# Patient Record
Sex: MALE | Race: WHITE | NOT HISPANIC OR LATINO | Employment: OTHER | URBAN - NONMETROPOLITAN AREA
[De-identification: names, ages, dates, MRNs, and addresses within clinical notes are randomized per-mention and may not be internally consistent; named-entity substitution may affect disease eponyms.]

---

## 2023-12-29 ENCOUNTER — OFFICE VISIT (OUTPATIENT)
Dept: OTOLARYNGOLOGY | Facility: CLINIC | Age: 33
End: 2023-12-29
Payer: COMMERCIAL

## 2023-12-29 VITALS
HEIGHT: 69 IN | HEART RATE: 78 BPM | TEMPERATURE: 97.5 F | BODY MASS INDEX: 27.43 KG/M2 | SYSTOLIC BLOOD PRESSURE: 136 MMHG | DIASTOLIC BLOOD PRESSURE: 80 MMHG | OXYGEN SATURATION: 98 % | WEIGHT: 185.2 LBS

## 2023-12-29 DIAGNOSIS — J31.0 CHRONIC RHINITIS: ICD-10-CM

## 2023-12-29 DIAGNOSIS — J34.89 NASAL OBSTRUCTION: Primary | ICD-10-CM

## 2023-12-29 DIAGNOSIS — Z98.890 S/P NASAL SEPTOPLASTY: ICD-10-CM

## 2023-12-29 DIAGNOSIS — J30.89 NON-SEASONAL ALLERGIC RHINITIS, UNSPECIFIED TRIGGER: ICD-10-CM

## 2023-12-29 PROCEDURE — 99204 OFFICE O/P NEW MOD 45 MIN: CPT | Performed by: OTOLARYNGOLOGY

## 2023-12-29 RX ORDER — FLUTICASONE PROPIONATE 50 MCG
2 SPRAY, SUSPENSION (ML) NASAL DAILY
COMMUNITY
Start: 2023-09-28 | End: 2024-09-27

## 2023-12-29 RX ORDER — IPRATROPIUM BROMIDE 42 UG/1
2 SPRAY, METERED NASAL 3 TIMES DAILY
Qty: 15 ML | Refills: 3 | Status: SHIPPED | OUTPATIENT
Start: 2023-12-29

## 2023-12-29 NOTE — PROGRESS NOTES
Review of Systems   Constitutional: Negative.    HENT:  Positive for congestion and sore throat.    Eyes: Negative.    Respiratory: Negative.     Cardiovascular: Negative.    Gastrointestinal: Negative.    Endocrine: Negative.    Genitourinary: Negative.    Musculoskeletal: Negative.    Skin: Negative.    Allergic/Immunologic: Negative.    Hematological: Negative.    Psychiatric/Behavioral: Negative.

## 2023-12-29 NOTE — PROGRESS NOTES
Otolaryngology Clinic Visit  Name:  Dae CEBALLOS  MRN:  [id number]  Date:  12/29/2023 10:41 AM  ________________________________________________________________________       CHIEF COMPLAINT:   Nasal obstruction     HPI:  Dae CEBALLOS is a 33 y.o. male s/p septoplasty in July 2023 at [facility] with persistent trouble breathing out of the right nostril, clear rhinitis, and post-nasal drip that has not significantly improved since surgery. He uses flonase but no saline rinses at this time. Does not report really any improvement after surgery. Likely worse. No other ENT questions or concerns today.    PMHx:  Past Medical History:   Diagnosis Date    Anxiety     Asthma     History of tonsillectomy and adenoidectomy        PSHx:  Past Surgical History:   Procedure Laterality Date    FINGER SURGERY      TONSILLECTOMY      TYMPANOSTOMY TUBE PLACEMENT         FAMHx:  History reviewed. No pertinent family history.    SOCHx:  Social History     Socioeconomic History    Marital status: /Civil Union     Spouse name: None    Number of children: None    Years of education: None    Highest education level: None   Occupational History    None   Tobacco Use    Smoking status: Never    Smokeless tobacco: Never    Tobacco comments:     Medical Marijuana-- Patient does not smoke tobacco.   Vaping Use    Vaping status: Never Used   Substance and Sexual Activity    Alcohol use: Yes     Comment: socially    Drug use: Yes     Types: Marijuana    Sexual activity: None   Other Topics Concern    None   Social History Narrative    None     Social Determinants of Health     Financial Resource Strain: Not on file   Food Insecurity: Not on file   Transportation Needs: Not on file   Physical Activity: Not on file   Stress: Not on file   Social Connections: Not on file   Intimate Partner Violence: Not on file   Housing Stability: Not on file       Allergies:  Allergies   Allergen Reactions    Penicillins Other (See Comments)     Family hx  "and patient has not had a reaction himself that he knows of        MEDS:  Reviewed    ROS:  As above       PHYSICAL EXAM:  /80 (BP Location: Left arm, Cuff Size: Large)   Pulse 78   Temp 97.5 °F (36.4 °C) (Temporal)   Ht 5' 9\" (1.753 m)   Wt 84 kg (185 lb 3.2 oz)   SpO2 98%   BMI 27.35 kg/m²   General: NAD, AOx4  Eyes:  EOMI  Ears:  Right: ear canal normal, TM normal appearance, no fluid  Left: ear canal normal, TM normal appearance, no fluid  Nose:  narrow nasal vestibule, there is caudal Right septal deviation, moderate inferior turbinate hypertrophy, no mass/lesions, palpable residual cartilage   Oral cavity:  No trismus, no mass/lesions, tonsils unremarkable, cobblestoning on posterior pharyngeal wall  Neck: Trachea is midline; no thyroid nodules, Salivary glands symmetrical, no masses/abnormality on palpation  Lymph:  No cervical lymphadenopathy  Skin:  No obvious facial lesions  Neuro:  Face symmetrical. No focal deficits   Lungs:  Normal work of breathing  Vascular: Well perfused    Procedures:   None    Medical Data Reviewed:  Records reviewed and summarized as in [facility]    Radiology:  None    Labs:   None    There is no problem list on file for this patient.      ASSESSMENT/PLAN:  Dae CEBALLOS is a 33 y.o. male with acute and chronic problems as above who presents with:    1. Nasal obstruction    2. Non-seasonal allergic rhinitis, unspecified trigger    3. S/P nasal septoplasty    4. Chronic rhinitis        33 y.o. here today for further evaluation of nasal obstruction status post septoplasty over the summer at [facility].  On physical exam he has significant anterior caudal septal deviation to the right.  Discussed how surgical options would be an open rhinoplasty with caudal reconstruction.  We will refer him to Dr. Chauhan for further evaluation and consideration of surgical options.  For his postnasal drip, we will add Atrovent nasal spray onto his Flonase. Might consider PNA at that same " time    Return to clinic as needed.  Patient will follow-up with Dr. Chauhan at [place]     Marco Mak MD MPH  Otolaryngology--Head and Neck Surgery  [facility]  12/29/2023 11:14 AM

## 2024-01-03 ENCOUNTER — OFFICE VISIT (OUTPATIENT)
Dept: URGENT CARE | Facility: MEDICAL CENTER | Age: 34
End: 2024-01-03
Payer: COMMERCIAL

## 2024-01-03 VITALS
HEART RATE: 90 BPM | TEMPERATURE: 98.2 F | BODY MASS INDEX: 27.91 KG/M2 | RESPIRATION RATE: 18 BRPM | OXYGEN SATURATION: 96 % | SYSTOLIC BLOOD PRESSURE: 108 MMHG | WEIGHT: 189 LBS | DIASTOLIC BLOOD PRESSURE: 64 MMHG

## 2024-01-03 DIAGNOSIS — H92.02 OTALGIA OF LEFT EAR: ICD-10-CM

## 2024-01-03 DIAGNOSIS — H65.113 ACUTE MUCOID OTITIS MEDIA OF BOTH EARS: Primary | ICD-10-CM

## 2024-01-03 PROCEDURE — 99214 OFFICE O/P EST MOD 30 MIN: CPT | Performed by: NURSE PRACTITIONER

## 2024-01-03 RX ORDER — CLINDAMYCIN HYDROCHLORIDE 300 MG/1
300 CAPSULE ORAL 3 TIMES DAILY
Qty: 21 CAPSULE | Refills: 0 | Status: SHIPPED | OUTPATIENT
Start: 2024-01-03 | End: 2024-01-10

## 2024-01-03 RX ORDER — OFLOXACIN 3 MG/ML
5 SOLUTION AURICULAR (OTIC) 2 TIMES DAILY
Qty: 10 ML | Refills: 0 | Status: SHIPPED | OUTPATIENT
Start: 2024-01-03

## 2024-01-03 NOTE — PROGRESS NOTES
[facility]        NAME: Dae CEBALLOS is a 33 y.o. male  : 1990    MRN: [id number]  DATE: January 3, 2024  TIME: 5:13 PM    Assessment and Plan   Acute mucoid otitis media of both ears [H65.113]  1. Acute mucoid otitis media of both ears  clindamycin (CLEOCIN) 300 MG capsule    ofloxacin (FLOXIN) 0.3 % otic solution      2. Otalgia of left ear  clindamycin (CLEOCIN) 300 MG capsule    ofloxacin (FLOXIN) 0.3 % otic solution            Patient Instructions       Follow up with PCP in 3-5 days.  Proceed to  ER if symptoms worsen.  You have been prescribed clindamycin and ear drops for bilateral ear infection  Take tylenol and motrin for pain/fever  Follow up with your PCP in 3-5 days  Go to the ED if symptoms worsen        Chief Complaint     Chief Complaint   Patient presents with    Earache     Left ear pressure and pain. Behind and below ear. Started about 2 days ago.         History of Present Illness       This is a 33 year old male who states is having left ear pressure with pain below the left ear for about 2 days.  Denies pain over the bone behind the ear. Denies fevers, chills, n/v/d. Taking tylenol/motrin for pain.  PMH as listed         Review of Systems   Review of Systems   Constitutional: Negative.    HENT:  Positive for ear pain. Negative for ear discharge.    Eyes: Negative.    Respiratory: Negative.     Cardiovascular: Negative.    Gastrointestinal: Negative.    Endocrine: Negative.    Genitourinary: Negative.    Musculoskeletal: Negative.    Skin: Negative.    Allergic/Immunologic: Negative.    Neurological: Negative.    Hematological: Negative.    Psychiatric/Behavioral: Negative.           Current Medications       Current Outpatient Medications:     albuterol (PROVENTIL HFA,VENTOLIN HFA) 90 mcg/act inhaler, Inhale 2 puffs every 6 (six) hours as needed for wheezing, Disp: , Rfl:     clindamycin (CLEOCIN) 300 MG capsule, Take 1 capsule (300 mg total) by mouth 3 (three) times a day  for 7 days, Disp: 21 capsule, Rfl: 0    Dulera 200-5 MCG/ACT inhaler, INHALE 2 PUFFS EVERY 12 (TWELVE) HOURS. RINSE, GARGLE AND SPIT POST USE., Disp: , Rfl:     ipratropium (ATROVENT) 0.06 % nasal spray, 2 sprays into each nostril 3 (three) times a day, Disp: 15 mL, Rfl: 3    meclizine (ANTIVERT) 25 mg tablet, Take 0.5 tablets (12.5 mg total) by mouth 3 (three) times a day as needed for dizziness, Disp: 30 tablet, Rfl: 0    montelukast (SINGULAIR) 10 mg tablet, TAKE 1 TABLET BY MOUTH EVERY DAY AT NIGHT, Disp: , Rfl:     NON FORMULARY, Medical Marijuana, Disp: , Rfl:     ofloxacin (FLOXIN) 0.3 % otic solution, Administer 5 drops into both ears 2 (two) times a day, Disp: 10 mL, Rfl: 0    omeprazole (PriLOSEC) 20 mg delayed release capsule, Take 20 mg by mouth daily, Disp: , Rfl:     fluticasone (FLONASE) 50 mcg/act nasal spray, 2 sprays into each nostril daily, Disp: , Rfl:     methocarbamol (ROBAXIN) 500 mg tablet, Take 1 tablet (500 mg total) by mouth 2 (two) times a day (Patient not taking: Reported on 1/7/2021), Disp: 20 tablet, Rfl: 0    Current Allergies     Allergies as of 01/03/2024 - Reviewed 01/03/2024   Allergen Reaction Noted    Penicillins Other (See Comments) 09/02/2017            The following portions of the patient's history were reviewed and updated as appropriate: allergies, current medications, past family history, past medical history, past social history, past surgical history and problem list.     Past Medical History:   Diagnosis Date    Anxiety     Asthma     History of tonsillectomy and adenoidectomy        Past Surgical History:   Procedure Laterality Date    FINGER SURGERY      SEPTOPLASTY      TONSILLECTOMY      TYMPANOSTOMY TUBE PLACEMENT         History reviewed. No pertinent family history.      Medications have been verified.        Objective   /64   Pulse 90   Temp 98.2 °F (36.8 °C)   Resp 18   Wt 85.7 kg (189 lb)   SpO2 96%   BMI 27.91 kg/m²   No LMP for male patient.        Physical Exam     Physical Exam  Vitals and nursing note reviewed.   Constitutional:       General: He is not in acute distress.     Appearance: Normal appearance. He is normal weight. He is not ill-appearing, toxic-appearing or diaphoretic.   HENT:      Head: Normocephalic and atraumatic.      Ears:      Comments: B/L TM with yellow mucoid noted.  No drainage.  Ear canal slightly red inflamed  No TTP over mastoid bone.  Ear does not protrude out.       Nose: Congestion present. No rhinorrhea.      Mouth/Throat:      Mouth: Mucous membranes are moist.      Pharynx: Oropharynx is clear. No oropharyngeal exudate or posterior oropharyngeal erythema.   Eyes:      Extraocular Movements: Extraocular movements intact.   Cardiovascular:      Rate and Rhythm: Normal rate and regular rhythm.      Pulses: Normal pulses.      Heart sounds: Normal heart sounds.   Pulmonary:      Effort: Pulmonary effort is normal.      Breath sounds: Normal breath sounds.   Musculoskeletal:         General: Normal range of motion.      Cervical back: Normal range of motion and neck supple.   Skin:     General: Skin is warm and dry.      Capillary Refill: Capillary refill takes less than 2 seconds.   Neurological:      General: No focal deficit present.      Mental Status: He is alert and oriented to person, place, and time.   Psychiatric:         Mood and Affect: Mood normal.         Behavior: Behavior normal.         Thought Content: Thought content normal.         Judgment: Judgment normal.

## 2024-01-03 NOTE — PATIENT INSTRUCTIONS
You have been prescribed clindamycin and ear drops for bilateral ear infection  Take tylenol and motrin for pain/fever  Follow up with your PCP in 3-5 days  Go to the ED if symptoms worsen

## 2024-01-04 ENCOUNTER — TELEPHONE (OUTPATIENT)
Dept: INTERNAL MEDICINE CLINIC | Facility: CLINIC | Age: 34
End: 2024-01-04

## 2024-01-04 NOTE — TELEPHONE ENCOUNTER
----- Message from Marco Mak MD sent at 1/2/2024  6:04 PM EST -----  Regarding: RE:   That's fine    Thank you !  ----- Message -----  From: Cathleen Ag LPN  Sent: 1/2/2024   5:38 PM EST  To: Laine Chaves; Marco Mak MD    Yes I can schedule this. Dr. Elkins next clinic day is February 23rd. Is it ok for patient to wait until then to see him?    ----- Message -----  From: Laine Chaves  Sent: 1/2/2024   7:45 AM EST  To: Cathleen Ag LPN    Hi Cathleen,    Are you able to facilitate this for Dr. Mak Please?  ----- Message -----  From: Marco Mak MD  Sent: 12/29/2023  10:42 AM EST  To: Laine Chaves    Can we have Vaugh see Dr Chauhan at [facility] for FNR eval.    I dont know how to coordinate that

## 2024-02-23 ENCOUNTER — CONSULT (OUTPATIENT)
Dept: MULTI SPECIALTY CLINIC | Facility: CLINIC | Age: 34
End: 2024-02-23

## 2024-02-23 VITALS
HEART RATE: 73 BPM | BODY MASS INDEX: 28.94 KG/M2 | DIASTOLIC BLOOD PRESSURE: 77 MMHG | TEMPERATURE: 98.6 F | OXYGEN SATURATION: 98 % | SYSTOLIC BLOOD PRESSURE: 115 MMHG | WEIGHT: 196 LBS

## 2024-02-23 DIAGNOSIS — J34.2 DNS (DEVIATED NASAL SEPTUM): ICD-10-CM

## 2024-02-23 DIAGNOSIS — J34.89 NASAL OBSTRUCTION: Primary | ICD-10-CM

## 2024-02-23 PROCEDURE — 99213 OFFICE O/P EST LOW 20 MIN: CPT | Performed by: OTOLARYNGOLOGY

## 2024-02-23 RX ORDER — IPRATROPIUM BROMIDE 42 UG/1
2 SPRAY, METERED NASAL 3 TIMES DAILY
Qty: 15 ML | Refills: 3 | Status: SHIPPED | OUTPATIENT
Start: 2024-02-23

## 2024-02-23 NOTE — PROGRESS NOTES
Dae CEBALLOS is a 33 y.o.male who presents for re-evaluation of nasal obstruction. Previous septoplasty July 2023. Has remaining right caudal deviation. Continued obstructive symptoms.  Currently on flonase and atrovent without relief.   Past Medical History:   Diagnosis Date    Anxiety     Asthma     History of tonsillectomy and adenoidectomy        /77 (BP Location: Left arm, Patient Position: Sitting, Cuff Size: Large)   Pulse 73   Temp 98.6 °F (37 °C) (Temporal)   Wt 88.9 kg (196 lb)   SpO2 98%   BMI 28.94 kg/m²       Physical Exam   Constitutional: Oriented to person, place, and time. Well-developed and well-nourished, no apparent distress, non-toxic appearance. Cooperative, able to hear and answer questions without difficulty.    Voice: Normal voice quality.  Head: Normocephalic, atraumatic.  No scars, masses or lesions.  Face: Symmetric, no edema, no sinus tenderness.  Eyes: Vision grossly intact, extra-ocular movement intact.  Ears: External ear normal.  Bilateral tympanic membranes are intact with intact normal landmarks. No post-auricular erythema or tenderness.  Nose:    Mucosa: Edematous   Turbinates: reduced bilaterally    Septum: right caudal septal deviation    Internal valves: bilateral internal valve stenosis   External valves:   Right  Left      Zone 1: 0  1      Zone 2: 0  1   Tip support: poor tip support    External contour: rightward deviation of the nasal pyramid    Nasal bones: no palpable nasal bone fractures     Oral cavity: Dentition intact.  Mucosa moist, lips normal.  Tongue mobile, floor of mouth normal.  Hard palate unremarkable.  No masses or lesions.   Oropharynx: Uvula is midline, soft palate normal.  Unremarkable oropharyngeal inlet.  Tonsils unremarkable.  Posterior pharyngeal wall clear. No masses or lesions.  Salivary glands:  Parotid glands and submandibular glands symmetric, no enlargement or tenderness.  Neck: Normal laryngeal elevation with swallow.  Trachea  midline.  No masses or lesions. No palpable adenopathy.  Thyroid: normal in size, unremarkable without tenderness or palpable nodules.  Pulmonary/Chest: Normal effort and rate. No respiratory distress. Clear to ausculation bilaterally  Musculoskeletal: Normal range of motion.   Neurological: Cranial nerves 2-12 intact.  Skin: Skin is warm and dry.   Psychiatric: Normal mood and affect.  CV: RRR          A/P: Nasal obstruction: Risks and benefits were again reviewed with the patient, including but not limited to bleeding, infection, external scars, internal scars, breathing obstruction, septal perforation, external deformity, numbness or stiffness of the nasal tip, asymmetry, need for revision, numbness of the upper teeth, among others.  Questions were answered.  History and physical and consent were obtained today in clinic.  Plan is for:    Discussed surgical timing planned for 1 year after original surgery (July 2023). Will plan for ASR, RNVS, neuromark     We discussed the need for rib grafting.  We discussed options for autologous and cadaveric rib grafting.  We discussed the procedure including risks of significant discomfort, pneumothorax, warping of the graft, infection of the graft, and need for other procedures in case of complications.      Patient did not sign consent in clinic. Will sign consent form day of surgery.

## 2024-02-27 ENCOUNTER — TELEPHONE (OUTPATIENT)
Dept: INTERNAL MEDICINE CLINIC | Facility: CLINIC | Age: 34
End: 2024-02-27

## 2024-02-27 NOTE — TELEPHONE ENCOUNTER
Dr. Chauhan in office and requesting surgery for patient. Requesting surgery revision septoplasty, repair nasal vestibular stenosis +/- ribgrafting. Please schedule patient surgery. Consent in media, Tennille CHASE forget to have signed.

## 2024-03-04 ENCOUNTER — HOSPITAL ENCOUNTER (EMERGENCY)
Facility: HOSPITAL | Age: 34
Discharge: HOME | End: 2024-03-04
Attending: EMERGENCY MEDICINE
Payer: COMMERCIAL

## 2024-03-04 ENCOUNTER — APPOINTMENT (EMERGENCY)
Dept: RADIOLOGY | Facility: HOSPITAL | Age: 34
End: 2024-03-04
Payer: COMMERCIAL

## 2024-03-04 VITALS
TEMPERATURE: 97.9 F | RESPIRATION RATE: 20 BRPM | OXYGEN SATURATION: 98 % | SYSTOLIC BLOOD PRESSURE: 123 MMHG | DIASTOLIC BLOOD PRESSURE: 75 MMHG | HEART RATE: 67 BPM

## 2024-03-04 DIAGNOSIS — R06.02 SOB (SHORTNESS OF BREATH): Primary | ICD-10-CM

## 2024-03-04 LAB
ANION GAP SERPL CALCULATED.3IONS-SCNC: 11 MMOL/L
BASOPHILS # BLD AUTO: 0.04 THOUSANDS/ÂΜL (ref 0–0.1)
BASOPHILS NFR BLD AUTO: 1 % (ref 0–1)
BUN SERPL-MCNC: 15 MG/DL (ref 5–25)
CALCIUM SERPL-MCNC: 9.4 MG/DL (ref 8.4–10.2)
CHLORIDE SERPL-SCNC: 104 MMOL/L (ref 96–108)
CO2 SERPL-SCNC: 24 MMOL/L (ref 21–32)
CREAT SERPL-MCNC: 0.91 MG/DL (ref 0.6–1.3)
EOSINOPHIL # BLD AUTO: 0.03 THOUSAND/ÂΜL (ref 0–0.61)
EOSINOPHIL NFR BLD AUTO: 1 % (ref 0–6)
ERYTHROCYTE [DISTWIDTH] IN BLOOD BY AUTOMATED COUNT: 12.7 % (ref 11.6–15.1)
GFR SERPL CREATININE-BSD FRML MDRD: 111 ML/MIN/1.73SQ M
GLUCOSE SERPL-MCNC: 107 MG/DL (ref 65–140)
HCT VFR BLD AUTO: 39.2 % (ref 36.5–49.3)
HGB BLD-MCNC: 13.9 G/DL (ref 12–17)
IMM GRANULOCYTES # BLD AUTO: 0.01 THOUSAND/UL (ref 0–0.2)
IMM GRANULOCYTES NFR BLD AUTO: 0 % (ref 0–2)
LYMPHOCYTES # BLD AUTO: 3.18 THOUSANDS/ÂΜL (ref 0.6–4.47)
LYMPHOCYTES NFR BLD AUTO: 51 % (ref 14–44)
MCH RBC QN AUTO: 29.6 PG (ref 26.8–34.3)
MCHC RBC AUTO-ENTMCNC: 35.5 G/DL (ref 31.4–37.4)
MCV RBC AUTO: 83 FL (ref 82–98)
MONOCYTES # BLD AUTO: 0.55 THOUSAND/ÂΜL (ref 0.17–1.22)
MONOCYTES NFR BLD AUTO: 9 % (ref 4–12)
NEUTROPHILS # BLD AUTO: 2.3 THOUSANDS/ÂΜL (ref 1.85–7.62)
NEUTS SEG NFR BLD AUTO: 38 % (ref 43–75)
NRBC BLD AUTO-RTO: 0 /100 WBCS
PLATELET # BLD AUTO: 356 THOUSANDS/UL (ref 149–390)
PMV BLD AUTO: 9.4 FL (ref 8.9–12.7)
POTASSIUM SERPL-SCNC: 3.4 MMOL/L (ref 3.5–5.3)
RBC # BLD AUTO: 4.7 MILLION/UL (ref 3.88–5.62)
SODIUM SERPL-SCNC: 139 MMOL/L (ref 135–147)
WBC # BLD AUTO: 6.11 THOUSAND/UL (ref 4.31–10.16)

## 2024-03-04 PROCEDURE — 80048 BASIC METABOLIC PNL TOTAL CA: CPT | Performed by: EMERGENCY MEDICINE

## 2024-03-04 PROCEDURE — 36415 COLL VENOUS BLD VENIPUNCTURE: CPT | Performed by: EMERGENCY MEDICINE

## 2024-03-04 PROCEDURE — 85025 COMPLETE CBC W/AUTO DIFF WBC: CPT | Performed by: EMERGENCY MEDICINE

## 2024-03-04 PROCEDURE — 99285 EMERGENCY DEPT VISIT HI MDM: CPT | Performed by: EMERGENCY MEDICINE

## 2024-03-04 PROCEDURE — 93005 ELECTROCARDIOGRAM TRACING: CPT

## 2024-03-04 PROCEDURE — 71046 X-RAY EXAM CHEST 2 VIEWS: CPT

## 2024-03-04 PROCEDURE — 96374 THER/PROPH/DIAG INJ IV PUSH: CPT

## 2024-03-04 PROCEDURE — 99285 EMERGENCY DEPT VISIT HI MDM: CPT

## 2024-03-04 RX ORDER — KETOROLAC TROMETHAMINE 30 MG/ML
15 INJECTION, SOLUTION INTRAMUSCULAR; INTRAVENOUS ONCE
Status: COMPLETED | OUTPATIENT
Start: 2024-03-04 | End: 2024-03-04

## 2024-03-04 RX ADMIN — KETOROLAC TROMETHAMINE 15 MG: 30 INJECTION, SOLUTION INTRAMUSCULAR; INTRAVENOUS at 20:05

## 2024-03-05 NOTE — DISCHARGE INSTRUCTIONS
Please continue nebulizer/albuterol treatment every 3-4 hours as needed for shortness of breath.

## 2024-03-05 NOTE — ED PROVIDER NOTES
History  Chief Complaint   Patient presents with    Shortness of Breath     Per pt, began w/ worsening SOB this morning. C/o coughing attacks.     HPI    33-year-old male with past medical history of asthma who presents for evaluation of shortness of breath patient states he has been having worsening shortness of breath since this morning.  Patient also states he has had some right-sided chest pain which feels like a cramping sensation.  Denies any pain in his back.  Denies any worsening of pain with taking a deep breath.  Patient states he does have a history of asthma but states this feels different.  Denies lightheadedness.  Denies fevers.  Denies nausea, vomiting or diarrhea. He has had a cough today. He also states he has myalgias. Denies leg swelling.     Prior to Admission Medications   Prescriptions Last Dose Informant Patient Reported? Taking?   Dulera 200-5 MCG/ACT inhaler  Self Yes No   Sig: INHALE 2 PUFFS EVERY 12 (TWELVE) HOURS. RINSE, GARGLE AND SPIT POST USE.   NON FORMULARY  Self Yes No   Sig: Medical Marijuana   albuterol (PROVENTIL HFA,VENTOLIN HFA) 90 mcg/act inhaler  Self Yes No   Sig: Inhale 2 puffs every 6 (six) hours as needed for wheezing   fluticasone (FLONASE) 50 mcg/act nasal spray  Self Yes No   Si sprays into each nostril daily   ipratropium (ATROVENT) 0.06 % nasal spray   No No   Si sprays into each nostril 3 (three) times a day   meclizine (ANTIVERT) 25 mg tablet  Self No No   Sig: Take 0.5 tablets (12.5 mg total) by mouth 3 (three) times a day as needed for dizziness   methocarbamol (ROBAXIN) 500 mg tablet  Self No No   Sig: Take 1 tablet (500 mg total) by mouth 2 (two) times a day   Patient not taking: Reported on 2021   montelukast (SINGULAIR) 10 mg tablet  Self Yes No   Sig: TAKE 1 TABLET BY MOUTH EVERY DAY AT NIGHT   ofloxacin (FLOXIN) 0.3 % otic solution   No No   Sig: Administer 5 drops into both ears 2 (two) times a day   omeprazole (PriLOSEC) 20 mg delayed release  capsule  Self Yes No   Sig: Take 20 mg by mouth daily      Facility-Administered Medications: None       Past Medical History:   Diagnosis Date    Anxiety     Asthma     History of tonsillectomy and adenoidectomy        Past Surgical History:   Procedure Laterality Date    FINGER SURGERY      SEPTOPLASTY      TONSILLECTOMY      TYMPANOSTOMY TUBE PLACEMENT         History reviewed. No pertinent family history.  I have reviewed and agree with the history as documented.    E-Cigarette/Vaping    E-Cigarette Use Never User      E-Cigarette/Vaping Substances     Social History     Tobacco Use    Smoking status: Never    Smokeless tobacco: Never    Tobacco comments:     Medical Marijuana-- Patient does not smoke tobacco.   Vaping Use    Vaping status: Never Used   Substance Use Topics    Alcohol use: Yes     Comment: socially    Drug use: Yes     Types: Marijuana       Review of Systems   Constitutional:  Negative for appetite change, chills and fever.   HENT:  Negative for congestion, rhinorrhea and sore throat.    Respiratory:  Positive for cough and shortness of breath.    Cardiovascular:  Positive for chest pain. Negative for leg swelling.   Gastrointestinal:  Negative for abdominal pain, diarrhea, nausea and vomiting.   Genitourinary:  Negative for dysuria, frequency, hematuria and urgency.   Musculoskeletal:  Negative for arthralgias and myalgias.   Skin:  Negative for rash.   Neurological:  Negative for dizziness, weakness, light-headedness, numbness and headaches.   All other systems reviewed and are negative.      Physical Exam  Physical Exam  Vitals and nursing note reviewed.   Constitutional:       General: He is not in acute distress.     Appearance: Normal appearance. He is well-developed and normal weight. He is not ill-appearing, toxic-appearing or diaphoretic.   HENT:      Head: Normocephalic and atraumatic.      Right Ear: External ear normal.      Left Ear: External ear normal.      Nose: Nose normal.       Mouth/Throat:      Mouth: Mucous membranes are moist.      Pharynx: Oropharynx is clear.   Eyes:      Extraocular Movements: Extraocular movements intact.      Conjunctiva/sclera: Conjunctivae normal.   Cardiovascular:      Rate and Rhythm: Normal rate and regular rhythm.      Pulses: Normal pulses.      Heart sounds: Normal heart sounds. No murmur heard.     No friction rub. No gallop.   Pulmonary:      Effort: Pulmonary effort is normal. No respiratory distress.      Breath sounds: Normal breath sounds. No decreased breath sounds, wheezing, rhonchi or rales.   Abdominal:      General: There is no distension.      Palpations: Abdomen is soft.      Tenderness: There is no abdominal tenderness. There is no guarding or rebound.   Musculoskeletal:         General: No tenderness.      Cervical back: Neck supple.      Right lower leg: No tenderness. No edema.      Left lower leg: No tenderness. No edema.   Skin:     General: Skin is warm and dry.      Coloration: Skin is not pale.      Findings: No rash.   Neurological:      General: No focal deficit present.      Mental Status: He is alert and oriented to person, place, and time.      Cranial Nerves: No cranial nerve deficit.      Sensory: No sensory deficit.      Motor: No weakness.   Psychiatric:         Mood and Affect: Mood normal.         Behavior: Behavior normal.         Vital Signs  ED Triage Vitals   Temperature Pulse Respirations Blood Pressure SpO2   03/04/24 1948 03/04/24 1945 03/04/24 1945 03/04/24 1945 03/04/24 1944   97.9 °F (36.6 °C) 73 21 126/81 99 %      Temp Source Heart Rate Source Patient Position - Orthostatic VS BP Location FiO2 (%)   03/04/24 1948 03/04/24 1948 -- -- --   Tympanic Monitor         Pain Score       03/04/24 1944       10 - Worst Possible Pain           Vitals:    03/04/24 1945 03/04/24 1948 03/04/24 2051   BP: 126/81 126/81 123/75   Pulse: 73 80 67         Visual Acuity      ED Medications  Medications   ketorolac (TORADOL)  injection 15 mg (15 mg Intravenous Given 3/4/24 2005)       Diagnostic Studies  Results Reviewed       Procedure Component Value Units Date/Time    Basic metabolic panel [[id number]]  (Abnormal) Collected: 03/04/24 2006    Lab Status: Final result Specimen: Blood from Arm, Left Updated: 03/04/24 2028     Sodium 139 mmol/L      Potassium 3.4 mmol/L      Chloride 104 mmol/L      CO2 24 mmol/L      ANION GAP 11 mmol/L      BUN 15 mg/dL      Creatinine 0.91 mg/dL      Glucose 107 mg/dL      Calcium 9.4 mg/dL      eGFR 111 ml/min/1.73sq m     Narrative:      National Kidney Disease Foundation guidelines for Chronic Kidney Disease (CKD):     Stage 1 with normal or high GFR (GFR > 90 mL/min/1.73 square meters)    Stage 2 Mild CKD (GFR = 60-89 mL/min/1.73 square meters)    Stage 3A Moderate CKD (GFR = 45-59 mL/min/1.73 square meters)    Stage 3B Moderate CKD (GFR = 30-44 mL/min/1.73 square meters)    Stage 4 Severe CKD (GFR = 15-29 mL/min/1.73 square meters)    Stage 5 End Stage CKD (GFR <15 mL/min/1.73 square meters)  Note: GFR calculation is accurate only with a steady state creatinine    CBC and differential [[id number]]  (Abnormal) Collected: 03/04/24 2006    Lab Status: Final result Specimen: Blood from Arm, Left Updated: 03/04/24 2013     WBC 6.11 Thousand/uL      RBC 4.70 Million/uL      Hemoglobin 13.9 g/dL      Hematocrit 39.2 %      MCV 83 fL      MCH 29.6 pg      MCHC 35.5 g/dL      RDW 12.7 %      MPV 9.4 fL      Platelets 356 Thousands/uL      nRBC 0 /100 WBCs      Neutrophils Relative 38 %      Immat GRANS % 0 %      Lymphocytes Relative 51 %      Monocytes Relative 9 %      Eosinophils Relative 1 %      Basophils Relative 1 %      Neutrophils Absolute 2.30 Thousands/µL      Immature Grans Absolute 0.01 Thousand/uL      Lymphocytes Absolute 3.18 Thousands/µL      Monocytes Absolute 0.55 Thousand/µL      Eosinophils Absolute 0.03 Thousand/µL      Basophils Absolute 0.04 Thousands/µL                    XR chest 2  views   Final Result by Chadwick Frazier MD (03/05 0843)      No acute cardiopulmonary disease.            Workstation performed: [id number]                    Procedures  Procedures         ED Course                                             Medical Decision Making  Amount and/or Complexity of Data Reviewed  Labs: ordered.  Radiology: ordered.    Risk  Prescription drug management.      33-year-old male with past medical history of asthma who presents for evaluation of shortness of breath patient states he has been having worsening shortness of breath since this morning.   Vital signs normal, pulse ox 97 to 98%, no increased work of breathing or tachypnea.  Suspect viral syndrome  Patient does not have any wheezing.  Will treat with Toradol for chest pain.  Will obtain chest x-ray to evaluate for pneumonia.  Will obtain CBC to evaluate for anemia, BMP to evaluate for metabolic abnormality.  Will obtain EKG to evaluate for arrhythmia or pericarditis.   Reviewed labs, no marked abnormalities.  Reviewed and interpreted chest x-ray, no acute cardiopulmonary disease.  Reviewed and interpreted EKG, shows normal sinus rhythm without acute ischemic changes, no signs of pericarditis.  Reassessed patient, chest pain slightly improved.  Breathing still unlabored, oxygenation normal.  Will discharge patient home.  Will have patient continue albuterol inhalers as needed for wheezing.  Discussed with patient strict return precautions.  Patient expressed understanding and was agreeable for discharge.         Disposition  Final diagnoses:   SOB (shortness of breath)     Time reflects when diagnosis was documented in both MDM as applicable and the Disposition within this note       Time User Action Codes Description Comment    3/4/2024  9:00 PM Radha Saavedra Add [R06.02] SOB (shortness of breath)           ED Disposition       ED Disposition   Discharge    Condition   Stable    Date/Time   Mon Mar 4, 2024  9:00 PM    Comment    Dae CEBALLOS discharge to home/self care.                   Follow-up Information       Follow up With Specialties Details Why Contact Info    Duglas Hartman,  Family Medicine   [address] [facility] [address] [phone]              Discharge Medication List as of 3/4/2024  9:00 PM        CONTINUE these medications which have NOT CHANGED    Details   albuterol (PROVENTIL HFA,VENTOLIN HFA) 90 mcg/act inhaler Inhale 2 puffs every 6 (six) hours as needed for wheezing, Historical Med      Dulera 200-5 MCG/ACT inhaler INHALE 2 PUFFS EVERY 12 (TWELVE) HOURS. RINSE, GARGLE AND SPIT POST USE., Historical Med      fluticasone (FLONASE) 50 mcg/act nasal spray 2 sprays into each nostril daily, Starting Thu 9/28/2023, Until Fri 9/27/2024, Historical Med      ipratropium (ATROVENT) 0.06 % nasal spray 2 sprays into each nostril 3 (three) times a day, Starting Fri 2/23/2024, Normal      meclizine (ANTIVERT) 25 mg tablet Take 0.5 tablets (12.5 mg total) by mouth 3 (three) times a day as needed for dizziness, Starting Fri 8/19/2022, Normal      methocarbamol (ROBAXIN) 500 mg tablet Take 1 tablet (500 mg total) by mouth 2 (two) times a day, Starting Tue 12/29/2020, Normal      montelukast (SINGULAIR) 10 mg tablet TAKE 1 TABLET BY MOUTH EVERY DAY AT NIGHT, Historical Med      NON FORMULARY Medical Marijuana, Historical Med      ofloxacin (FLOXIN) 0.3 % otic solution Administer 5 drops into both ears 2 (two) times a day, Starting Wed 1/3/2024, Normal      omeprazole (PriLOSEC) 20 mg delayed release capsule Take 20 mg by mouth daily, Starting Wed 8/2/2023, Historical Med             No discharge procedures on file.    PDMP Review       None            ED Provider  Electronically Signed by             Radha Saavedra MD  03/05/24 2782

## 2024-03-07 LAB
ATRIAL RATE: 66 BPM
P AXIS: 81 DEGREES
PR INTERVAL: 138 MS
QRS AXIS: 70 DEGREES
QRSD INTERVAL: 90 MS
QT INTERVAL: 406 MS
QTC INTERVAL: 425 MS
T WAVE AXIS: 67 DEGREES
VENTRICULAR RATE: 66 BPM

## 2024-03-07 PROCEDURE — 93010 ELECTROCARDIOGRAM REPORT: CPT | Performed by: INTERNAL MEDICINE

## 2024-05-07 ENCOUNTER — TELEPHONE (OUTPATIENT)
Age: 34
End: 2024-05-07

## 2024-05-07 NOTE — TELEPHONE ENCOUNTER
Patient's spouse called for information about scheduling surgery. Patient was seen by Dr. Mak in December. Please advise.

## 2024-05-07 NOTE — TELEPHONE ENCOUNTER
Explained that Dr Mak does not come to [place] anymore. The Dae needs to be seen at the [facility] in [place]. Provided the phone to them.

## 2024-06-07 ENCOUNTER — OFFICE VISIT (OUTPATIENT)
Age: 34
End: 2024-06-07
Payer: COMMERCIAL

## 2024-06-07 VITALS
HEART RATE: 80 BPM | WEIGHT: 196 LBS | HEIGHT: 69 IN | RESPIRATION RATE: 18 BRPM | OXYGEN SATURATION: 98 % | BODY MASS INDEX: 29.03 KG/M2

## 2024-06-07 DIAGNOSIS — R10.13 DYSPEPSIA: ICD-10-CM

## 2024-06-07 DIAGNOSIS — R10.11 RIGHT UPPER QUADRANT ABDOMINAL PAIN: Primary | ICD-10-CM

## 2024-06-07 DIAGNOSIS — R19.5 LOOSE STOOLS: ICD-10-CM

## 2024-06-07 PROCEDURE — 99204 OFFICE O/P NEW MOD 45 MIN: CPT | Performed by: STUDENT IN AN ORGANIZED HEALTH CARE EDUCATION/TRAINING PROGRAM

## 2024-06-07 NOTE — PATIENT INSTRUCTIONS
We will order a HIDA scan to rule out functional gallbladder issues  We will also order a stool test to rule out infection of the stomach called H.pylori and also inflammatory bowel and pancreas function  You will need to hold the omeprazole for at least 2 weeks before doing the stool test  Take the omeprazole every other day for 2 weeks and then stop for 2 weeks before doing the stool test

## 2024-06-11 NOTE — PROGRESS NOTES
[facility]  Outpatient New Patient  Encounter: [id number]     PATIENT INFO     Name: Dae CEBALLOS  YOB: 1990   Age: 34 y.o.   Sex: male   MRN: [id number]    ASSESSMENT & PLAN     Dae CEBALLOS is a 34 y.o. male with history of seasonal allergies who presents to GI office for second opinion regarding right upper quadrant pain and dyspepsia symptoms.    Problem List Items Addressed This Visit       Right upper quadrant abdominal pain - Primary     The pain that he is experiencing appears to be coming from the right subcostal/right upper quadrant area.  He has had ultrasound which was negative for gallstones.  However, he has not had any improvement in his symptoms with omeprazole and Pepcid.  He has undergone endoscopic evaluation which was largely unremarkable and esophageal manometry which was also normal.  His symptoms may actually be due to biliary pathology possibly related to sphincter of Oddi dysfunction or nonvisualized gallstones or motility disorder.    I recommend HIDA scan to complete biliary workup  If this is positive, would recommend referral to general surgery for cholecystectomy  We will defer repeating endoscopy at this time as he has had this recently at outside facility and report was reviewed which was largely normal  However, if symptoms persist and HIDA scan is negative, repeating endoscopy with gastric biopsies may not be unreasonable  He should continue omeprazole for now as directed         Relevant Orders    NM hepatobiliary w rx    H. pylori antigen, stool    Dyspepsia     His symptoms may be related to atypical biliary pathology as noted above.  However, H. pylori infection is not fully ruled out.  Less likely to be IBD or EPI certainly possible.  Could be due to underlying functional disorder as well.    Plan for HIDA scan as noted above  We will also check stool tests to rule out H. pylori, fecal calprotectin and pancreatic  elastase  Instructed patient to take omeprazole every other day for 2 weeks and then to stop the medication for 2 weeks before completing the stool test  He may resume omeprazole after completing the stool test         Relevant Orders    H. pylori antigen, stool    Loose stools     Does complain of some loose stools.  Could be due to underlying IBS.  His symptoms have now been ongoing for over a year.  Possibly related to postprandial etiologies which may suggest dietary factors.  Could be due to underlying functional disorders as well.  Lower suspicion for IBD or EPI but certainly possible.  Low suspicion for infection.    We will check stool studies as noted above  Continue PPI  Could consider trial of rifaximin if symptoms persist         Relevant Orders    Calprotectin,Fecal    Pancreatic elastase, fecal     Orders Placed This Encounter   Procedures    H. pylori antigen, stool    Calprotectin,Fecal    Pancreatic elastase, fecal    NM hepatobiliary w rx       FOLLOW-UP: 3 months    HISTORY OF PRESENT ILLNESS       Dae CEBALLOS is a 34 y.o. male who presents to GI office for second opinion in regards to GI symptoms consisting of right upper quadrant pain, stools.  Patient is new to this office.  He was previously being followed at  GI for these complaints.  He underwent endoscopic evaluation which was largely unremarkable.  This report was reviewed in the office.  He also underwent esophageal manometry there which was normal esophageal motility.  He reports symptoms consisting of right subcostal/right upper quadrant pain which is typically postprandial.  He states that he has had no relief of his symptoms despite being on omeprazole and also taking Pepcid.  He also complains of some loose stools which has been ongoing for the last year or so.  Denies zane diarrhea, hematochezia, melena.  He denies any dysphagia or odynophagia, nausea, or vomiting.  He underwent right upper quadrant ultrasound which was  negative for gallstones or other acute pathology.  However, his symptoms persist.  Patient states that he was recommended to undergo some procedure to place a magnet around his esophagus (likely Lynx antireflux procedure) but wanted to have a second opinion for other potential causes for his symptoms.     ENDOSCOPIC HISTORY     UPPER ENDOSCOPY: 12/2023 at outside facility with mildly irregular Z-line noted with Z-line at 40 cm, normal stomach, normal duodenum; esophageal biopsies performed    ESOPHAGEAL MANOMETRY: 11/2023 with normal esophageal motility    COLONOSCOPY: None    REVIEW OF SYSTEMS     CONSTITUTIONAL: Denies any fever, chills, rigors, and weight loss  HEENT: No earache or tinnitus, denies hearing loss or visual disturbances  CARDIOVASCULAR: No chest pain or palpitations  RESPIRATORY: Denies any cough, hemoptysis, shortness of breath or dyspnea on exertion  GASTROINTESTINAL: As noted in the History of Present Illness  GENITOURINARY: No problems with urination, denies any hematuria or dysuria  NEUROLOGIC: No dizziness or vertigo, denies headaches   MUSCULOSKELETAL: Denies any muscle or joint pain   SKIN: Denies jaundice or itching  PSYCHOSOCIAL: Denies depression or anxiety, denies any recent memory loss     Historical Information   Past Medical History:   Diagnosis Date    Anxiety     Asthma     History of tonsillectomy and adenoidectomy      Past Surgical History:   Procedure Laterality Date    FINGER SURGERY      SEPTOPLASTY      TONSILLECTOMY      TYMPANOSTOMY TUBE PLACEMENT       Social History   Social History     Substance and Sexual Activity   Alcohol Use Yes    Comment: socially     Social History     Substance and Sexual Activity   Drug Use Yes    Types: Marijuana     Social History     Tobacco Use   Smoking Status Never   Smokeless Tobacco Never   Tobacco Comments    Medical Marijuana-- Patient does not smoke tobacco.     History reviewed. No pertinent family history.    MEDICATIONS & ALLERGIES  "    Current Outpatient Medications   Medication Instructions    albuterol (PROVENTIL HFA,VENTOLIN HFA) 90 mcg/act inhaler 2 puffs, Inhalation, Every 6 hours PRN    Dulera 200-5 MCG/ACT inhaler INHALE 2 PUFFS EVERY 12 (TWELVE) HOURS. RINSE, GARGLE AND SPIT POST USE.    fluticasone (FLONASE) 50 mcg/act nasal spray 2 sprays, Nasal, Daily    ipratropium (ATROVENT) 0.06 % nasal spray 2 sprays, Nasal, 3 times daily    meclizine (ANTIVERT) 12.5 mg, Oral, 3 times daily PRN    methocarbamol (ROBAXIN) 500 mg, Oral, 2 times daily    montelukast (SINGULAIR) 10 mg tablet TAKE 1 TABLET BY MOUTH EVERY DAY AT NIGHT    NON FORMULARY Medical Marijuana     ofloxacin (FLOXIN) 0.3 % otic solution 5 drops, Both Ears, 2 times daily    omeprazole (PRILOSEC) 20 mg, Oral, Daily     Allergies   Allergen Reactions    Penicillins Other (See Comments)     Family hx and patient has not had a reaction himself that he knows of       PHYSICAL EXAM      Objective   Pulse 80, resp. rate 18, height 5' 9\" (1.753 m), weight 88.9 kg (196 lb), SpO2 98%. Body mass index is 28.94 kg/m².    General Appearance:   Alert, cooperative, no distress   HEENT:   Normocephalic, atraumatic, anicteric     Neck:   Supple, symmetrical, trachea midline   Lungs:   Equal chest rise, respirations unlabored    Heart:   Regular rate   Abdomen:   Soft, non-tender, non-distended; normal bowel sounds; no masses, no organomegaly    Rectal:   Deferred    Extremities:   No cyanosis or edema    Neuro:   Moves all 4 extremities    Skin:   No jaundice, rashes, or lesions       LABORATORY RESULTS     No visits with results within 1 Day(s) from this visit.   Latest known visit with results is:   Admission on 03/04/2024, Discharged on 03/04/2024   Component Date Value    Ventricular Rate 03/04/2024 66     Atrial Rate 03/04/2024 66     WI Interval 03/04/2024 138     QRSD Interval 03/04/2024 90     QT Interval 03/04/2024 406     QTC Interval 03/04/2024 425     P Axis 03/04/2024 81     QRS " Axis 03/04/2024 70     T Wave [place] 03/04/2024 67     WBC 03/04/2024 6.11     RBC 03/04/2024 4.70     Hemoglobin 03/04/2024 13.9     Hematocrit 03/04/2024 39.2     MCV 03/04/2024 83     MCH 03/04/2024 29.6     MCHC 03/04/2024 35.5     RDW 03/04/2024 12.7     MPV 03/04/2024 9.4     Platelets 03/04/2024 356     nRBC 03/04/2024 0     Segmented % 03/04/2024 38 (L)     Immature Grans % 03/04/2024 0     Lymphocytes % 03/04/2024 51 (H)     Monocytes % 03/04/2024 9     Eosinophils Relative 03/04/2024 1     Basophils Relative 03/04/2024 1     Absolute Neutrophils 03/04/2024 2.30     Absolute Immature Grans 03/04/2024 0.01     Absolute Lymphocytes 03/04/2024 3.18     Absolute Monocytes 03/04/2024 0.55     Eosinophils Absolute 03/04/2024 0.03     Basophils Absolute 03/04/2024 0.04     Sodium 03/04/2024 139     Potassium 03/04/2024 3.4 (L)     Chloride 03/04/2024 104     CO2 03/04/2024 24     ANION GAP 03/04/2024 11     BUN 03/04/2024 15     Creatinine 03/04/2024 0.91     Glucose 03/04/2024 107     Calcium 03/04/2024 9.4     eGFR 03/04/2024 111         IMAGING RESULTS     No results found.  I have personally reviewed any available and pertinent imaging study reports.      Papa Shabazz D.O.  [facility]  Division of Gastroenterology & Hepatology  Available on [email or web address]  [email or web address]    ** Please Note: This note is constructed using a voice recognition dictation system. **

## 2024-06-11 NOTE — ASSESSMENT & PLAN NOTE
His symptoms may be related to atypical biliary pathology as noted above.  However, H. pylori infection is not fully ruled out.  Less likely to be IBD or EPI certainly possible.  Could be due to underlying functional disorder as well.    Plan for HIDA scan as noted above  We will also check stool tests to rule out H. pylori, fecal calprotectin and pancreatic elastase  Instructed patient to take omeprazole every other day for 2 weeks and then to stop the medication for 2 weeks before completing the stool test  He may resume omeprazole after completing the stool test

## 2024-06-11 NOTE — ASSESSMENT & PLAN NOTE
Does complain of some loose stools.  Could be due to underlying IBS.  His symptoms have now been ongoing for over a year.  Possibly related to postprandial etiologies which may suggest dietary factors.  Could be due to underlying functional disorders as well.  Lower suspicion for IBD or EPI but certainly possible.  Low suspicion for infection.    We will check stool studies as noted above  Continue PPI  Could consider trial of rifaximin if symptoms persist

## 2024-06-11 NOTE — ASSESSMENT & PLAN NOTE
The pain that he is experiencing appears to be coming from the right subcostal/right upper quadrant area.  He has had ultrasound which was negative for gallstones.  However, he has not had any improvement in his symptoms with omeprazole and Pepcid.  He has undergone endoscopic evaluation which was largely unremarkable and esophageal manometry which was also normal.  His symptoms may actually be due to biliary pathology possibly related to sphincter of Oddi dysfunction or nonvisualized gallstones or motility disorder.    I recommend HIDA scan to complete biliary workup  If this is positive, would recommend referral to general surgery for cholecystectomy  We will defer repeating endoscopy at this time as he has had this recently at outside facility and report was reviewed which was largely normal  However, if symptoms persist and HIDA scan is negative, repeating endoscopy with gastric biopsies may not be unreasonable  He should continue omeprazole for now as directed

## 2024-06-21 ENCOUNTER — HOSPITAL ENCOUNTER (OUTPATIENT)
Dept: NUCLEAR MEDICINE | Facility: HOSPITAL | Age: 34
End: 2024-06-21
Attending: STUDENT IN AN ORGANIZED HEALTH CARE EDUCATION/TRAINING PROGRAM
Payer: COMMERCIAL

## 2024-06-21 DIAGNOSIS — R10.11 RIGHT UPPER QUADRANT ABDOMINAL PAIN: ICD-10-CM

## 2024-06-21 PROCEDURE — A9537 TC99M MEBROFENIN: HCPCS

## 2024-06-21 PROCEDURE — 78227 HEPATOBIL SYST IMAGE W/DRUG: CPT

## 2024-06-21 RX ORDER — SINCALIDE 5 UG/5ML
0.02 INJECTION, POWDER, LYOPHILIZED, FOR SOLUTION INTRAVENOUS
Status: COMPLETED | OUTPATIENT
Start: 2024-06-21 | End: 2024-06-21

## 2024-06-21 RX ADMIN — SINCALIDE 1.8 MCG: 5 INJECTION, POWDER, LYOPHILIZED, FOR SOLUTION INTRAVENOUS at 12:16

## 2024-06-25 ENCOUNTER — OFFICE VISIT (OUTPATIENT)
Dept: PULMONOLOGY | Facility: CLINIC | Age: 34
End: 2024-06-25
Payer: COMMERCIAL

## 2024-06-25 VITALS
OXYGEN SATURATION: 98 % | HEART RATE: 94 BPM | WEIGHT: 198 LBS | HEIGHT: 69 IN | TEMPERATURE: 98.5 F | BODY MASS INDEX: 29.33 KG/M2 | DIASTOLIC BLOOD PRESSURE: 83 MMHG | SYSTOLIC BLOOD PRESSURE: 144 MMHG

## 2024-06-25 DIAGNOSIS — G47.19 EXCESSIVE DAYTIME SLEEPINESS: ICD-10-CM

## 2024-06-25 DIAGNOSIS — R07.89 OTHER CHEST PAIN: ICD-10-CM

## 2024-06-25 DIAGNOSIS — R06.02 SHORTNESS OF BREATH: ICD-10-CM

## 2024-06-25 DIAGNOSIS — J45.20 MILD INTERMITTENT ASTHMA WITHOUT COMPLICATION: Primary | ICD-10-CM

## 2024-06-25 DIAGNOSIS — R06.83 SNORING: ICD-10-CM

## 2024-06-25 PROCEDURE — 99244 OFF/OP CNSLTJ NEW/EST MOD 40: CPT

## 2024-06-25 RX ORDER — CETIRIZINE HYDROCHLORIDE 10 MG/1
10 TABLET ORAL DAILY
COMMUNITY
Start: 2024-05-06

## 2024-06-25 RX ORDER — ESOMEPRAZOLE MAGNESIUM 40 MG/1
40 CAPSULE, DELAYED RELEASE ORAL
COMMUNITY
Start: 2024-06-07

## 2024-06-25 RX ORDER — MOMETASONE FUROATE AND FORMOTEROL FUMARATE DIHYDRATE 100; 5 UG/1; UG/1
2 AEROSOL RESPIRATORY (INHALATION) 2 TIMES DAILY
Qty: 13 G | Refills: 2 | Status: SHIPPED | OUTPATIENT
Start: 2024-06-25

## 2024-06-25 NOTE — PROGRESS NOTES
Consultation - Pulmonary Medicine   Dae Julio 34 y.o. male MRN: [id number]    Physician Requesting Consult: Self  Reason for Consult: Dyspnea  Dae Julio is a 34 y.o. male with PMHx of asthma who presents for pulmonary evaluation.    Musculoskeletal Chest Pain  Shortness of Breath  - Patient reports chest pain and dyspnea which has been present for a year after exerting himself while playing baseball with his son.  The dyspnea does not correlate with level of activity, but does seem to be worsened by pain in his chest that appears musculoskeletal in nature and is reproducible on palpation.  It is possible this is costochondritis as he has had relief with NSAIDs and Robaxin.  The etiology of his dyspnea is less clear, does not appear this is related to his asthma given his symptoms and lack of benefit with inhaler therapy as noted below.  He does have a reported isolated DLCO reduction on outside PFTs although full values are not currently available.  - Will recheck PFTs if there is a isolated DLCO reduction would consider further workup.  - Encouraged him to speak to his PCP about the musculoskeletal chest pain.  - Follow-up in 3 months.  -     Complete PFT with post bronchodilator; Future    Mild Intermittent Asthma without Exacerbation  Seasonal Allergies  - The patient has a history of mild asthma dating back to childhood and until 1 year ago was not requiring inhaler therapy.  He is currently on Dulera 200 mcg and albuterol HFA as needed.  It is unclear if his current symptoms are related to his asthma as he has not had any benefit with increasing doses of ICS/LABA and does not get significant benefit with his albuterol HFA.  Additionally his symptoms are not correlating with increased activity.  - As he did not notice a difference with the Dulera 200 mcg will decrease back to 100 mcg.  Discussed the importance of rinsing his mouth after each use.  - Continue with albuterol HFA as needed  - Continue  "with cetirizine, Singulair, Flonase and ipratropium for seasonal allergies  -     mometasone-formoterol (Dulera) 100-5 MCG/ACT inhaler; Inhale 2 puffs 2 (two) times a day Rinse mouth after use.    Snoring  Excessive Daytime Sleepiness  - The patient has symptoms consistent with sleep apnea including snoring and EDS with prior OPAL diagnosed as a child s/p T&A.  STOP-BANG during today's visit was 3.  - Will obtain HSAT  - Discussed with the patient the possible diagnosis, causes and conditions associated with SDB along with potential treatments.  - Encouraged healthy lifestyle with adequate sleep (7-9 hours per night), healthy balanced diet and routine exercise.  -     Diagnostic Sleep Study; Future    Thank you for allowing me to participate in the care of your patient.  If there are any questions regarding evaluation please feel free to reach out.     Return in about 3 months (around 9/25/2024).  ______________________________________________________________________    HPI:    Dae Julio is a 34 y.o. male with PMHx of asthma who presents for pulmonary evaluation.  The patient is presenting today due to dyspnea and chest pain which has been ongoing for the last year.  He states that he was playing baseball with his son 1 year ago when he felt a popping sensation in his right chest followed by an aching/straining sensation.  He did since then he has had persistent dyspnea both at rest and with exertion.  He denies any cough or wheezing associated with the dyspnea, but does state that he feels worse after eating or if he sleeps in the \"wrong\" position.  He notes that if he sleeps on his right side he will experience a \"popping\" sensation in his mid chest which brings on dyspnea as well.  He reports that he was on naproxen for time which was helping his symptoms, but was stopped.  He also was recently given Robaxin for separate issue and states this provided benefit.  He denies any other arthralgias, myalgias or " rashes outside of these sensations in his chest.  He denies exertional chest pain, palpitations, pedal edema, syncope, unintentional weight loss, hemoptysis or night sweats.    He does note that he has a history of asthma that was diagnosed around 9 or 10 years old and he has previously seen a pulmonologist at [facility] for this issue.  He reports that prior to this event he was not requiring any inhaler therapy and had not had any significant asthma symptoms since he was younger.  After this he was started on albuterol HFA with Dulera which has now been titrated up to 200 mcg.  He states he is taking 2 puffs twice daily and rinsing out his mouth after each use, but that he does not note much benefit.  He is using albuterol HFA prior to activity, states this also does not appear to be helping.  He denies any recent steroid use for asthma purposes, but does state that he had a course of prednisone 1 year ago for chest pain.  He denies any prior hospitalizations for respiratory purposes.    He does also have seasonal allergies and is on cetirizine, Flonase and ipratropium nasal spray with Singulair.  He has a known deviated septum and is following with ENT.  He reports he is planning to have septoplasty in August.    In regards to his sleep, the patient is noted to snore, but his wife denies any witnessed apneas.  He does have rare times where he will wake up choking/gasping.  He notes as a kid he was diagnosed with OPAL and had a previous T&A.  He does note he feels quite sleepy during the day and often wakes up with dry mouth, but denies morning headaches.  He has not had any sleep studies as an adult.    Tobacco/Vaping: denies cigarettes, vaping/e-cigs, cigars and chewing tobacco, reports he was smoking medical marijuana around the time this occurred, but has since switched to edibles.  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    Current Medications:    Current Outpatient Medications:     cetirizine (ZyrTEC) 10 mg tablet, Take 10 mg by mouth daily, Disp: , Rfl:     esomeprazole (NexIUM) 40 MG capsule, Take 40 mg by mouth daily before breakfast, Disp: , Rfl:     mometasone-formoterol (Dulera) 100-5 MCG/ACT inhaler, Inhale 2 puffs 2 (two) times a day Rinse mouth after use., Disp: 13 g, Rfl: 2    albuterol (PROVENTIL HFA,VENTOLIN HFA) 90 mcg/act inhaler, Inhale 2 puffs every 6 (six) hours as needed for wheezing, Disp: , Rfl:     fluticasone (FLONASE) 50 mcg/act nasal spray, 2 sprays into each nostril daily, Disp: , Rfl:     ipratropium (ATROVENT) 0.06 % nasal spray, 2 sprays into each nostril 3 (three) times a day, Disp: 15 mL, Rfl: 3    meclizine (ANTIVERT) 25 mg tablet, Take 0.5 tablets (12.5 mg total) by mouth 3 (three) times a day as needed for dizziness, Disp: 30 tablet, Rfl: 0    methocarbamol (ROBAXIN) 500 mg tablet, Take 1 tablet (500 mg total) by mouth 2 (two) times a day (Patient not taking: Reported on 1/7/2021), Disp: 20 tablet, Rfl: 0    montelukast (SINGULAIR) 10 mg tablet, TAKE 1 TABLET BY MOUTH EVERY DAY AT NIGHT, Disp: , Rfl:     NON FORMULARY, Medical Marijuana, Disp: , Rfl:     ofloxacin (FLOXIN) 0.3 % otic solution, Administer 5 drops into both ears 2 (two) times a day, Disp: 10 mL, Rfl: 0    Historical Information   Past Medical History:   Diagnosis Date    Anxiety     Asthma     History of tonsillectomy and adenoidectomy      Past Surgical History:   Procedure Laterality Date    FINGER SURGERY      SEPTOPLASTY      TONSILLECTOMY      TYMPANOSTOMY TUBE PLACEMENT     Social History   Social History     Tobacco Use   Smoking Status Never  "  Smokeless Tobacco Never   Tobacco Comments    Medical Marijuana-- Patient does not smoke tobacco.     Family History:   History reviewed. No pertinent family history.    PhysicalExamination:  Vitals:   /83   Pulse 94   Temp 98.5 °F (36.9 °C)   Ht 5' 9\" (1.753 m)   Wt 89.8 kg (198 lb)   SpO2 98%   BMI 29.24 kg/m²   Body mass index is 29.24 kg/m².    Constitutional: NAD, on room air, no conversational dyspnea   Skin: Warm, dry, no rashes noted   Eyes: PERRL, normal conjunctiva  ENT: Nasal congestion absent, moist mucus membranes.  Neck: No JVD, trachea is midline, no adenopathy.  Resp: CTA B/L, no W/R/R   Cardiac: RRR, +S1/S2, no M/R/G -- chest pain is reproducible on palpation  Abdomen: Soft, NT/ND  Extremities: No digital clubbing or pedal edema  Neuro: AAOx3    Diagnostic Data:  Labs:  I personally reviewed the most recent laboratory data pertinent to today's visit    Lab Results   Component Value Date    WBC 9.75 07/09/2024    HGB 13.1 07/09/2024    HCT 38.6 07/09/2024    MCV 87 07/09/2024     07/09/2024     Lab Results   Component Value Date    CALCIUM 9.7 07/09/2024    K 4.0 07/09/2024    CO2 25 07/09/2024     07/09/2024    BUN 13 07/09/2024    CREATININE 0.98 07/09/2024     No results found for: \"IGE\"  Lab Results   Component Value Date    ALT 13 07/09/2024    AST 16 07/09/2024    ALKPHOS 68 07/09/2024     PFT results:  The most recent pulmonary function tests were reviewed.  PFTs -- 4/18/2023 ([facility])  FVC 4.07L 107%  FEV1 3.34L 100%  FEV1/FVC 77  TLC and RV were reported to be normal  DLCO was reported to be mildly reduced    Imaging:  I personally reviewed the images in PACS pertinent to today's visit:  CXR 2 View -- 3/4/2024  No acute cardiopulmonary disease.     Other studies:  None    I have spent a total time of 45 minutes on 07/16/24 in caring for this patient including Instructions for management, Patient and family education, Importance of tx compliance, Counseling / " "Coordination of care, Documenting in the medical record, Reviewing / ordering tests, medicine, procedures  , and Obtaining or reviewing history  .    Bev Diallo MD  Pulmonary-Critical Care and Sleep Medicine  07/16/24    Portions of the record may have been created with voice recognition software. Occasional wrong word or \"sound a like\" substitutions may have occurred due to the inherent limitations of voice recognition software. Please read the chart carefully and recognize, using context, where substitutions have occurred.  "

## 2024-06-25 NOTE — PATIENT INSTRUCTIONS
- Decrease to dulera 100mcg 2 puffs twice daily and rinse out mouth after each use.  Script was sent to your pharmacy.  - Repeat breathing tests  - Sleep study in the sleep lab  - Follow up in 3 months

## 2024-07-09 ENCOUNTER — APPOINTMENT (EMERGENCY)
Dept: CT IMAGING | Facility: HOSPITAL | Age: 34
End: 2024-07-09
Payer: COMMERCIAL

## 2024-07-09 ENCOUNTER — HOSPITAL ENCOUNTER (EMERGENCY)
Facility: HOSPITAL | Age: 34
Discharge: HOME | End: 2024-07-09
Attending: EMERGENCY MEDICINE
Payer: COMMERCIAL

## 2024-07-09 VITALS
HEART RATE: 65 BPM | SYSTOLIC BLOOD PRESSURE: 145 MMHG | OXYGEN SATURATION: 98 % | TEMPERATURE: 97.8 F | RESPIRATION RATE: 19 BRPM | DIASTOLIC BLOOD PRESSURE: 90 MMHG

## 2024-07-09 DIAGNOSIS — R09.A2 SENSATION OF LUMP IN THROAT: Primary | ICD-10-CM

## 2024-07-09 LAB
ALBUMIN SERPL BCG-MCNC: 4.5 G/DL (ref 3.5–5)
ALP SERPL-CCNC: 68 U/L (ref 34–104)
ALT SERPL W P-5'-P-CCNC: 13 U/L (ref 7–52)
ANION GAP SERPL CALCULATED.3IONS-SCNC: 9 MMOL/L (ref 4–13)
AST SERPL W P-5'-P-CCNC: 16 U/L (ref 13–39)
BASOPHILS # BLD AUTO: 0.06 THOUSANDS/ÂΜL (ref 0–0.1)
BASOPHILS NFR BLD AUTO: 1 % (ref 0–1)
BILIRUB SERPL-MCNC: 0.44 MG/DL (ref 0.2–1)
BUN SERPL-MCNC: 13 MG/DL (ref 5–25)
CALCIUM SERPL-MCNC: 9.7 MG/DL (ref 8.4–10.2)
CHLORIDE SERPL-SCNC: 102 MMOL/L (ref 96–108)
CO2 SERPL-SCNC: 25 MMOL/L (ref 21–32)
CREAT SERPL-MCNC: 0.98 MG/DL (ref 0.6–1.3)
EOSINOPHIL # BLD AUTO: 0.38 THOUSAND/ÂΜL (ref 0–0.61)
EOSINOPHIL NFR BLD AUTO: 4 % (ref 0–6)
ERYTHROCYTE [DISTWIDTH] IN BLOOD BY AUTOMATED COUNT: 13.1 % (ref 11.6–15.1)
FLUAV RNA RESP QL NAA+PROBE: NEGATIVE
FLUBV RNA RESP QL NAA+PROBE: NEGATIVE
GFR SERPL CREATININE-BSD FRML MDRD: 101 ML/MIN/1.73SQ M
GLUCOSE SERPL-MCNC: 94 MG/DL (ref 65–140)
HCT VFR BLD AUTO: 38.6 % (ref 36.5–49.3)
HGB BLD-MCNC: 13.1 G/DL (ref 12–17)
IMM GRANULOCYTES # BLD AUTO: 0.04 THOUSAND/UL (ref 0–0.2)
IMM GRANULOCYTES NFR BLD AUTO: 0 % (ref 0–2)
LYMPHOCYTES # BLD AUTO: 3.79 THOUSANDS/ÂΜL (ref 0.6–4.47)
LYMPHOCYTES NFR BLD AUTO: 39 % (ref 14–44)
MCH RBC QN AUTO: 29.6 PG (ref 26.8–34.3)
MCHC RBC AUTO-ENTMCNC: 33.9 G/DL (ref 31.4–37.4)
MCV RBC AUTO: 87 FL (ref 82–98)
MONOCYTES # BLD AUTO: 1.04 THOUSAND/ÂΜL (ref 0.17–1.22)
MONOCYTES NFR BLD AUTO: 11 % (ref 4–12)
NEUTROPHILS # BLD AUTO: 4.44 THOUSANDS/ÂΜL (ref 1.85–7.62)
NEUTS SEG NFR BLD AUTO: 45 % (ref 43–75)
NRBC BLD AUTO-RTO: 0 /100 WBCS
PLATELET # BLD AUTO: 367 THOUSANDS/UL (ref 149–390)
PMV BLD AUTO: 9.4 FL (ref 8.9–12.7)
POTASSIUM SERPL-SCNC: 4 MMOL/L (ref 3.5–5.3)
PROT SERPL-MCNC: 7.4 G/DL (ref 6.4–8.4)
RBC # BLD AUTO: 4.42 MILLION/UL (ref 3.88–5.62)
RSV RNA RESP QL NAA+PROBE: NEGATIVE
S PYO DNA THROAT QL NAA+PROBE: NOT DETECTED
SARS-COV-2 RNA RESP QL NAA+PROBE: NEGATIVE
SODIUM SERPL-SCNC: 136 MMOL/L (ref 135–147)
WBC # BLD AUTO: 9.75 THOUSAND/UL (ref 4.31–10.16)

## 2024-07-09 PROCEDURE — 85025 COMPLETE CBC W/AUTO DIFF WBC: CPT | Performed by: EMERGENCY MEDICINE

## 2024-07-09 PROCEDURE — 87651 STREP A DNA AMP PROBE: CPT | Performed by: EMERGENCY MEDICINE

## 2024-07-09 PROCEDURE — 99285 EMERGENCY DEPT VISIT HI MDM: CPT | Performed by: EMERGENCY MEDICINE

## 2024-07-09 PROCEDURE — 70491 CT SOFT TISSUE NECK W/DYE: CPT

## 2024-07-09 PROCEDURE — 0241U HB NFCT DS VIR RESP RNA 4 TRGT: CPT | Performed by: EMERGENCY MEDICINE

## 2024-07-09 PROCEDURE — 36415 COLL VENOUS BLD VENIPUNCTURE: CPT | Performed by: EMERGENCY MEDICINE

## 2024-07-09 PROCEDURE — 80053 COMPREHEN METABOLIC PANEL: CPT | Performed by: EMERGENCY MEDICINE

## 2024-07-09 PROCEDURE — 99284 EMERGENCY DEPT VISIT MOD MDM: CPT

## 2024-07-09 RX ORDER — DEXAMETHASONE 4 MG/1
10 TABLET ORAL ONCE
Status: COMPLETED | OUTPATIENT
Start: 2024-07-09 | End: 2024-07-09

## 2024-07-09 RX ADMIN — DEXAMETHASONE 10 MG: 4 TABLET ORAL at 06:53

## 2024-07-09 RX ADMIN — IOHEXOL 85 ML: 350 INJECTION, SOLUTION INTRAVENOUS at 05:56

## 2024-07-09 NOTE — ED PROVIDER NOTES
History  Chief Complaint   Patient presents with    Medical Problem     Feels like he has a lump in his throat. Trouble swallowing per patient     34-year-old male with a past medical history of tonsillectomy, septoplasty, who presents for concern for throat swelling.  Patient reports that he has a longstanding history of sore throat, however he has noticed since waking up this morning that he feels like it is obstructed.  He also complains of pain. He describes this has been progressive for some time, but this morning when he woke up it felt occluded so he wanted to get evaluated. He is currently handling secretions well, he does not have stridor.  He describes that it seems to radiate to the left side of his face which is new for him as well.  He denies any cough or congestion, no fevers or chills.  No dental pain.  He has no other symptoms currently.  ROS otherwise negative.        Prior to Admission Medications   Prescriptions Last Dose Informant Patient Reported? Taking?   NON FORMULARY  Self Yes No   Sig: Medical Marijuana   albuterol (PROVENTIL HFA,VENTOLIN HFA) 90 mcg/act inhaler  Self Yes No   Sig: Inhale 2 puffs every 6 (six) hours as needed for wheezing   cetirizine (ZyrTEC) 10 mg tablet   Yes No   Sig: Take 10 mg by mouth daily   esomeprazole (NexIUM) 40 MG capsule   Yes No   Sig: Take 40 mg by mouth daily before breakfast   fluticasone (FLONASE) 50 mcg/act nasal spray  Self Yes No   Si sprays into each nostril daily   ipratropium (ATROVENT) 0.06 % nasal spray   No No   Si sprays into each nostril 3 (three) times a day   meclizine (ANTIVERT) 25 mg tablet  Self No No   Sig: Take 0.5 tablets (12.5 mg total) by mouth 3 (three) times a day as needed for dizziness   methocarbamol (ROBAXIN) 500 mg tablet  Self No No   Sig: Take 1 tablet (500 mg total) by mouth 2 (two) times a day   Patient not taking: Reported on 2021   mometasone-formoterol (Dulera) 100-5 MCG/ACT inhaler   No No   Sig: Inhale 2  puffs 2 (two) times a day Rinse mouth after use.   montelukast (SINGULAIR) 10 mg tablet  Self Yes No   Sig: TAKE 1 TABLET BY MOUTH EVERY DAY AT NIGHT   ofloxacin (FLOXIN) 0.3 % otic solution   No No   Sig: Administer 5 drops into both ears 2 (two) times a day      Facility-Administered Medications: None       Past Medical History:   Diagnosis Date    Anxiety     Asthma     History of tonsillectomy and adenoidectomy        Past Surgical History:   Procedure Laterality Date    FINGER SURGERY      SEPTOPLASTY      TONSILLECTOMY      TYMPANOSTOMY TUBE PLACEMENT         History reviewed. No pertinent family history.  I have reviewed and agree with the history as documented.    E-Cigarette/Vaping    E-Cigarette Use Never User      E-Cigarette/Vaping Substances     Social History     Tobacco Use    Smoking status: Never    Smokeless tobacco: Never    Tobacco comments:     Medical Marijuana-- Patient does not smoke tobacco.   Vaping Use    Vaping status: Never Used   Substance Use Topics    Alcohol use: Yes     Comment: socially    Drug use: Yes     Types: Marijuana       Review of Systems   Constitutional:  Negative for chills, diaphoresis, fatigue and fever.   HENT:  Positive for sore throat. Negative for congestion.    Eyes:  Negative for visual disturbance.   Respiratory:  Negative for cough, chest tightness and shortness of breath.    Cardiovascular:  Negative for chest pain, palpitations and leg swelling.   Gastrointestinal:  Negative for abdominal distention, abdominal pain, constipation, diarrhea, nausea and vomiting.   Genitourinary:  Negative for difficulty urinating and dysuria.   Musculoskeletal:  Negative for arthralgias and myalgias.   Skin:  Negative for rash.   Neurological:  Negative for dizziness, weakness, light-headedness, numbness and headaches.   Psychiatric/Behavioral:  Negative for agitation, behavioral problems and confusion. The patient is not nervous/anxious.    All other systems reviewed and are  negative.      Physical Exam  Physical Exam  Constitutional:       Appearance: He is well-developed.   HENT:      Head: Normocephalic and atraumatic.      Mouth/Throat:      Mouth: Mucous membranes are moist.      Pharynx: Posterior oropharyngeal erythema present.      Comments: Pt is s/p tonsillectomy. Oropharynx does appear minimally erythematous, however, no evidence of PTA, RPA. Handling secretions well.  Neck:      Comments: No mass, swelling, or tenderness appreciated. Pt describes swollen sensation just superior to sternal notch.  Cardiovascular:      Rate and Rhythm: Normal rate and regular rhythm.      Heart sounds: Normal heart sounds. No murmur heard.  Pulmonary:      Effort: Pulmonary effort is normal. No respiratory distress.      Breath sounds: Normal breath sounds.      Comments: No stridor  Abdominal:      General: Bowel sounds are normal. There is no distension.      Palpations: Abdomen is soft.      Tenderness: There is no abdominal tenderness.   Musculoskeletal:         General: No deformity.   Skin:     General: Skin is warm.      Findings: No rash.   Neurological:      Mental Status: He is alert and oriented to person, place, and time.   Psychiatric:         Behavior: Behavior normal.         Thought Content: Thought content normal.         Judgment: Judgment normal.         Vital Signs  ED Triage Vitals [07/09/24 0452]   Temperature Pulse Respirations Blood Pressure SpO2   97.8 °F (36.6 °C) 65 19 145/90 98 %      Temp Source Heart Rate Source Patient Position - Orthostatic VS BP Location FiO2 (%)   Temporal -- -- -- --      Pain Score       3           Vitals:    07/09/24 0452   BP: 145/90   Pulse: 65         Visual Acuity      ED Medications  Medications   iohexol (OMNIPAQUE) 350 MG/ML injection (MULTI-DOSE) 100 mL (85 mL Intravenous Given 7/9/24 7342)   dexamethasone (DECADRON) tablet 10 mg (10 mg Oral Given 7/9/24 7776)       Diagnostic Studies  Results Reviewed       Procedure Component  Value Units Date/Time    COVID/FLU/RSV [[id number]]  (Normal) Collected: 07/09/24 0708    Lab Status: Final result Specimen: Nares from Nose Updated: 07/09/24 0807     SARS-CoV-2 Negative     INFLUENZA A PCR Negative     INFLUENZA B PCR Negative     RSV PCR Negative    Narrative:      FOR PEDIATRIC PATIENTS - copy/paste COVID Guidelines URL to browser: [email or web address]    SARS-CoV-2 assay is a Nucleic Acid Amplification assay intended for the  qualitative detection of nucleic acid from SARS-CoV-2 in nasopharyngeal  swabs. Results are for the presumptive identification of SARS-CoV-2 RNA.    Positive results are indicative of infection with SARS-CoV-2, the virus  causing COVID-19, but do not rule out bacterial infection or co-infection  with other viruses. Laboratories within the [place] and its  territories are required to report all positive results to the appropriate  public health authorities. Negative results do not preclude SARS-CoV-2  infection and should not be used as the sole basis for treatment or other  patient management decisions. Negative results must be combined with  clinical observations, patient history, and epidemiological information.  This test has not been FDA cleared or approved.    This test has been authorized by FDA under an Emergency Use Authorization  (EUA). This test is only authorized for the duration of time the  declaration that circumstances exist justifying the authorization of the  emergency use of an in vitro diagnostic tests for detection of SARS-CoV-2  virus and/or diagnosis of COVID-19 infection under section 564(b)(1) of  the Act, 21 U.S.C. 360bbb-3(b)(1), unless the authorization is terminated  or revoked sooner. The test has been validated but independent review by FDA  and CLIA is pending.    Test performed using [employer]: This RT-PCR assay targets N2,  a region unique to SARS-CoV-2. A conserved region  in the E-gene was chosen  for pan-Sarbecovirus detection which includes SARS-CoV-2.    According to CMS-2020-01-R, this platform meets the definition of high-throughput technology.    Strep A PCR [[id number]]  (Normal) Collected: 07/09/24 0708    Lab Status: Final result Specimen: Throat Updated: 07/09/24 0747     STREP A PCR Not Detected    Comprehensive metabolic panel [[id number]] Collected: 07/09/24 0507    Lab Status: Final result Specimen: Blood from Arm, Left Updated: 07/09/24 0539     Sodium 136 mmol/L      Potassium 4.0 mmol/L      Chloride 102 mmol/L      CO2 25 mmol/L      ANION GAP 9 mmol/L      BUN 13 mg/dL      Creatinine 0.98 mg/dL      Glucose 94 mg/dL      Calcium 9.7 mg/dL      AST 16 U/L      ALT 13 U/L      Alkaline Phosphatase 68 U/L      Total Protein 7.4 g/dL      Albumin 4.5 g/dL      Total Bilirubin 0.44 mg/dL      eGFR 101 ml/min/1.73sq m     Narrative:      National Kidney Disease Foundation guidelines for Chronic Kidney Disease (CKD):     Stage 1 with normal or high GFR (GFR > 90 mL/min/1.73 square meters)    Stage 2 Mild CKD (GFR = 60-89 mL/min/1.73 square meters)    Stage 3A Moderate CKD (GFR = 45-59 mL/min/1.73 square meters)    Stage 3B Moderate CKD (GFR = 30-44 mL/min/1.73 square meters)    Stage 4 Severe CKD (GFR = 15-29 mL/min/1.73 square meters)    Stage 5 End Stage CKD (GFR <15 mL/min/1.73 square meters)  Note: GFR calculation is accurate only with a steady state creatinine    CBC and differential [[id number]] Collected: 07/09/24 0507    Lab Status: Final result Specimen: Blood from Arm, Left Updated: 07/09/24 0522     WBC 9.75 Thousand/uL      RBC 4.42 Million/uL      Hemoglobin 13.1 g/dL      Hematocrit 38.6 %      MCV 87 fL      MCH 29.6 pg      MCHC 33.9 g/dL      RDW 13.1 %      MPV 9.4 fL      Platelets 367 Thousands/uL      nRBC 0 /100 WBCs      Segmented % 45 %      Immature Grans % 0 %      Lymphocytes % 39 %      Monocytes % 11 %      Eosinophils Relative 4 %       Basophils Relative 1 %      Absolute Neutrophils 4.44 Thousands/µL      Absolute Immature Grans 0.04 Thousand/uL      Absolute Lymphocytes 3.79 Thousands/µL      Absolute Monocytes 1.04 Thousand/µL      Eosinophils Absolute 0.38 Thousand/µL      Basophils Absolute 0.06 Thousands/µL                    CT soft tissue neck with contrast   Final Result by Luis M Barnes MD (07/09 0700)         1. Moderately enlarged bilateral lingual tonsils as described above which may reflect tonsillar hypertrophy and/or lingual tonsillitis. Some resultant narrowing of the ventral aspect of the oropharynx/upper hypopharynx noted without zane airway    obstruction. Mildly prominent nasopharyngeal tonsils/adenoids.   2. Otherwise unremarkable study.            Workstation performed: [id number]                    Procedures  Procedures         ED Course  ED Course as of 07/09/24 2157 Tue Jul 09, 2024   0554 WBC: 9.75  Reassuring, no leukocytosis                               SBIRT 22yo+      Flowsheet Row Most Recent Value   Initial Alcohol Screen: US AUDIT-C     1. How often do you have a drink containing alcohol? 0 Filed at: 07/09/2024 0452   Audit-C Score 0 Filed at: 07/09/2024 0452                      Medical Decision Making  I reviewed the patient's medical chart, PMHx, prior encounters, medications.    My DDx includes: globus sensation, foreign body, RPA    After discussion of risks and benefits of CT imaging, patient would like to pursue this to ensure no obstruction. Will proceed with CT neck. Given patient's description of this occurring over a somewhat long period of time, suspect that viral or bacterial source is less likely (he also has no other URI sx, no fevers)'    Patient states he already follows up with ENT at [facility]. Will recommend he follow up closely with them. He understands this. He was given decadron, and viral/strep swab was performed given CT reading. Pt discharged with strict return precautions,  satisfied with care.    Amount and/or Complexity of Data Reviewed  Labs: ordered. Decision-making details documented in ED Course.  Radiology: ordered.    Risk  Prescription drug management.             Disposition  Final diagnoses:   Sensation of lump in throat     Time reflects when diagnosis was documented in both MDM as applicable and the Disposition within this note       Time User Action Codes Description Comment    7/9/2024  6:34 AM Alvaro Buchanan Add [R09.A2] Sensation of lump in throat           ED Disposition       ED Disposition   Discharge    Condition   Stable    Date/Time   Tue Jul 9, 2024 0705    Comment   Dae Julio discharge to home/self care.                   Follow-up Information       Follow up With Specialties Details Why Contact Info    Duglas Hartman,  Family Medicine Call  For re-evaluation [address] [facility] [address] [phone]              Discharge Medication List as of 7/9/2024  7:06 AM        CONTINUE these medications which have NOT CHANGED    Details   albuterol (PROVENTIL HFA,VENTOLIN HFA) 90 mcg/act inhaler Inhale 2 puffs every 6 (six) hours as needed for wheezing, Historical Med      cetirizine (ZyrTEC) 10 mg tablet Take 10 mg by mouth daily, Starting Mon 5/6/2024, Historical Med      esomeprazole (NexIUM) 40 MG capsule Take 40 mg by mouth daily before breakfast, Starting Fri 6/7/2024, Historical Med      fluticasone (FLONASE) 50 mcg/act nasal spray 2 sprays into each nostril daily, Starting Thu 9/28/2023, Until Fri 9/27/2024, Historical Med      ipratropium (ATROVENT) 0.06 % nasal spray 2 sprays into each nostril 3 (three) times a day, Starting Fri 2/23/2024, Normal      meclizine (ANTIVERT) 25 mg tablet Take 0.5 tablets (12.5 mg total) by mouth 3 (three) times a day as needed for dizziness, Starting Fri 8/19/2022, Normal      methocarbamol (ROBAXIN) 500 mg tablet Take 1 tablet (500 mg total) by mouth 2 (two) times a day, Starting Tue  12/29/2020, Normal      mometasone-formoterol (Dulera) 100-5 MCG/ACT inhaler Inhale 2 puffs 2 (two) times a day Rinse mouth after use., Starting Tue 6/25/2024, Normal      montelukast (SINGULAIR) 10 mg tablet TAKE 1 TABLET BY MOUTH EVERY DAY AT NIGHT, Historical Med      NON FORMULARY Medical Marijuana, Historical Med      ofloxacin (FLOXIN) 0.3 % otic solution Administer 5 drops into both ears 2 (two) times a day, Starting Wed 1/3/2024, Normal             No discharge procedures on file.    PDMP Review       None            ED Provider  Electronically Signed by             Alvaro Buchanan MD  07/09/24 4103

## 2024-07-18 ENCOUNTER — APPOINTMENT (OUTPATIENT)
Age: 34
End: 2024-07-18
Payer: COMMERCIAL

## 2024-07-18 DIAGNOSIS — R10.11 RIGHT UPPER QUADRANT ABDOMINAL PAIN: ICD-10-CM

## 2024-07-18 DIAGNOSIS — R19.5 LOOSE STOOLS: ICD-10-CM

## 2024-07-18 DIAGNOSIS — R10.13 DYSPEPSIA: ICD-10-CM

## 2024-07-18 PROCEDURE — 87338 HPYLORI STOOL AG IA: CPT

## 2024-07-18 PROCEDURE — 82653 EL-1 FECAL QUANTITATIVE: CPT

## 2024-07-18 PROCEDURE — 83993 ASSAY FOR CALPROTECTIN FECAL: CPT

## 2024-07-19 LAB
ELASTASE PANC STL-MCNT: >800 UG/G
H PYLORI AG STL QL IA: NEGATIVE

## 2024-07-21 LAB — CALPROTECTIN STL-MCNC: 37.9 ÂΜG/G

## 2024-07-24 ENCOUNTER — HOSPITAL ENCOUNTER (OUTPATIENT)
Dept: PULMONOLOGY | Facility: HOSPITAL | Age: 34
Discharge: HOME | End: 2024-07-24
Payer: COMMERCIAL

## 2024-07-24 DIAGNOSIS — R06.02 SHORTNESS OF BREATH: ICD-10-CM

## 2024-07-24 PROCEDURE — 94726 PLETHYSMOGRAPHY LUNG VOLUMES: CPT

## 2024-07-24 PROCEDURE — 94060 EVALUATION OF WHEEZING: CPT

## 2024-07-24 PROCEDURE — 94729 DIFFUSING CAPACITY: CPT

## 2024-07-24 PROCEDURE — 94760 N-INVAS EAR/PLS OXIMETRY 1: CPT

## 2024-07-24 RX ORDER — ALBUTEROL SULFATE 2.5 MG/3ML
2.5 SOLUTION RESPIRATORY (INHALATION) ONCE AS NEEDED
Status: COMPLETED | OUTPATIENT
Start: 2024-07-24 | End: 2024-07-24

## 2024-07-24 RX ADMIN — ALBUTEROL SULFATE 2.5 MG: 2.5 SOLUTION RESPIRATORY (INHALATION) at 08:26

## 2024-08-19 NOTE — PRE-PROCEDURE INSTRUCTIONS
Pre-Surgery Instructions:   Medication Instructions    albuterol (PROVENTIL HFA,VENTOLIN HFA) 90 mcg/act inhaler Uses PRN- OK to take day of surgery    cetirizine (ZyrTEC) 10 mg tablet Take day of surgery.    esomeprazole (NexIUM) 40 MG capsule Take day of surgery.    fluticasone (FLONASE) 50 mcg/act nasal spray Uses PRN- OK to take day of surgery    ipratropium (ATROVENT) 0.06 % nasal spray Uses PRN- OK to take day of surgery    meclizine (ANTIVERT) 25 mg tablet Uses PRN- OK to take day of surgery    mometasone-formoterol (Dulera) 100-5 MCG/ACT inhaler Take day of surgery.    montelukast (SINGULAIR) 10 mg tablet Take night before surgery    NON FORMULARY Hold day of surgery.      Medication instructions for day surgery reviewed. Please use only a sip of water to take your instructed medications. Avoid all over the counter vitamins, supplements and NSAIDS for one week prior to surgery per anesthesia guidelines. Tylenol is ok to take as needed.     You will receive a call one business day prior to surgery with an arrival time and hospital directions. If your surgery is scheduled on a Monday, the hospital will be calling you on the Friday prior to your surgery. If you have not heard from anyone by 8pm, please call the hospital supervisor through the hospital  at [phone]. ([place] [phone] or [place] [phone]).    Do not eat or drink anything after midnight the night before your surgery, including candy, mints, lifesavers, or chewing gum. Do not drink alcohol 24hrs before your surgery. Try not to smoke at least 24hrs before your surgery.       Follow the pre surgery showering instructions as listed in the “My Surgical Experience Booklet” or otherwise provided by your surgeon's office. Do not use a blade to shave the surgical area 1 week before surgery. It is okay to use a clean electric clippers up to 24 hours before surgery. Do not apply any lotions, creams, including makeup, cologne, deodorant,  or perfumes after showering on the day of your surgery. Do not use dry shampoo, hair spray, hair gel, or any type of hair products.     No contact lenses, eye make-up, or artificial eyelashes. Remove nail polish, including gel polish, and any artificial, gel, or acrylic nails if possible. Remove all jewelry including rings and body piercing jewelry.     Wear causal clothing that is easy to take on and off. Consider your type of surgery.    Keep any valuables, jewelry, piercings at home. Please bring any specially ordered equipment (sling, braces) if indicated.    Arrange for a responsible person to drive you to and from the hospital on the day of your surgery. Please confirm the visitor policy for the day of your procedure when you receive your phone call with an arrival time.     Call the surgeon's office with any new illnesses, exposures, or additional questions prior to surgery.    Please reference your “My Surgical Experience Booklet” for additional information to prepare for your upcoming surgery.

## 2024-08-21 ENCOUNTER — ANESTHESIA EVENT (OUTPATIENT)
Dept: PERIOP | Facility: HOSPITAL | Age: 34
End: 2024-08-21
Payer: COMMERCIAL

## 2024-08-22 ENCOUNTER — ANESTHESIA (OUTPATIENT)
Dept: PERIOP | Facility: HOSPITAL | Age: 34
End: 2024-08-22
Payer: COMMERCIAL

## 2024-08-22 ENCOUNTER — HOSPITAL ENCOUNTER (OUTPATIENT)
Facility: HOSPITAL | Age: 34
Setting detail: OUTPATIENT SURGERY
Discharge: HOME | End: 2024-08-22
Attending: OTOLARYNGOLOGY | Admitting: OTOLARYNGOLOGY
Payer: COMMERCIAL

## 2024-08-22 VITALS
HEART RATE: 79 BPM | RESPIRATION RATE: 16 BRPM | BODY MASS INDEX: 31.77 KG/M2 | DIASTOLIC BLOOD PRESSURE: 81 MMHG | TEMPERATURE: 97.9 F | HEIGHT: 69 IN | OXYGEN SATURATION: 97 % | WEIGHT: 214.51 LBS | SYSTOLIC BLOOD PRESSURE: 141 MMHG

## 2024-08-22 DIAGNOSIS — J34.89 NASAL OBSTRUCTION: Primary | ICD-10-CM

## 2024-08-22 DIAGNOSIS — J34.2 DEVIATED NASAL SEPTUM: ICD-10-CM

## 2024-08-22 PROCEDURE — 30465 REPAIR NASAL STENOSIS: CPT | Performed by: OTOLARYNGOLOGY

## 2024-08-22 PROCEDURE — 30140 RESECT INFERIOR TURBINATE: CPT | Performed by: OTOLARYNGOLOGY

## 2024-08-22 PROCEDURE — 30520 REPAIR OF NASAL SEPTUM: CPT | Performed by: OTOLARYNGOLOGY

## 2024-08-22 RX ORDER — PROPOFOL 10 MG/ML
INJECTION, EMULSION INTRAVENOUS AS NEEDED
Status: DISCONTINUED | OUTPATIENT
Start: 2024-08-22 | End: 2024-08-22

## 2024-08-22 RX ORDER — MEPERIDINE HYDROCHLORIDE 25 MG/ML
12.5 INJECTION INTRAMUSCULAR; INTRAVENOUS; SUBCUTANEOUS ONCE AS NEEDED
Status: DISCONTINUED | OUTPATIENT
Start: 2024-08-22 | End: 2024-08-22 | Stop reason: HOSPADM

## 2024-08-22 RX ORDER — CEFAZOLIN SODIUM 2 G/50ML
2000 SOLUTION INTRAVENOUS ONCE
Status: COMPLETED | OUTPATIENT
Start: 2024-08-22 | End: 2024-08-22

## 2024-08-22 RX ORDER — FENTANYL CITRATE/PF 50 MCG/ML
50 SYRINGE (ML) INJECTION
Status: DISCONTINUED | OUTPATIENT
Start: 2024-08-22 | End: 2024-08-22 | Stop reason: HOSPADM

## 2024-08-22 RX ORDER — OXYCODONE HCL 5 MG/5 ML
5 SOLUTION, ORAL ORAL EVERY 4 HOURS PRN
Status: DISCONTINUED | OUTPATIENT
Start: 2024-08-22 | End: 2024-08-22 | Stop reason: HOSPADM

## 2024-08-22 RX ORDER — HYDROMORPHONE HCL/PF 1 MG/ML
0.5 SYRINGE (ML) INJECTION
Status: DISCONTINUED | OUTPATIENT
Start: 2024-08-22 | End: 2024-08-22 | Stop reason: HOSPADM

## 2024-08-22 RX ORDER — ONDANSETRON 2 MG/ML
4 INJECTION INTRAMUSCULAR; INTRAVENOUS ONCE AS NEEDED
Status: DISCONTINUED | OUTPATIENT
Start: 2024-08-22 | End: 2024-08-22 | Stop reason: HOSPADM

## 2024-08-22 RX ORDER — ROCURONIUM BROMIDE 10 MG/ML
INJECTION, SOLUTION INTRAVENOUS AS NEEDED
Status: DISCONTINUED | OUTPATIENT
Start: 2024-08-22 | End: 2024-08-22

## 2024-08-22 RX ORDER — LIDOCAINE HYDROCHLORIDE 20 MG/ML
INJECTION, SOLUTION EPIDURAL; INFILTRATION; INTRACAUDAL; PERINEURAL AS NEEDED
Status: DISCONTINUED | OUTPATIENT
Start: 2024-08-22 | End: 2024-08-22

## 2024-08-22 RX ORDER — EPHEDRINE SULFATE 50 MG/ML
INJECTION INTRAVENOUS AS NEEDED
Status: DISCONTINUED | OUTPATIENT
Start: 2024-08-22 | End: 2024-08-22

## 2024-08-22 RX ORDER — FENTANYL CITRATE 50 UG/ML
INJECTION, SOLUTION INTRAMUSCULAR; INTRAVENOUS AS NEEDED
Status: DISCONTINUED | OUTPATIENT
Start: 2024-08-22 | End: 2024-08-22

## 2024-08-22 RX ORDER — DEXAMETHASONE SODIUM PHOSPHATE 10 MG/ML
INJECTION, SOLUTION INTRAMUSCULAR; INTRAVENOUS AS NEEDED
Status: DISCONTINUED | OUTPATIENT
Start: 2024-08-22 | End: 2024-08-22

## 2024-08-22 RX ORDER — SODIUM CHLORIDE 9 MG/ML
125 INJECTION, SOLUTION INTRAVENOUS CONTINUOUS
Status: DISCONTINUED | OUTPATIENT
Start: 2024-08-22 | End: 2024-08-22 | Stop reason: HOSPADM

## 2024-08-22 RX ORDER — GINSENG 100 MG
CAPSULE ORAL AS NEEDED
Status: DISCONTINUED | OUTPATIENT
Start: 2024-08-22 | End: 2024-08-22 | Stop reason: HOSPADM

## 2024-08-22 RX ORDER — LIDOCAINE HYDROCHLORIDE AND EPINEPHRINE 10; 10 MG/ML; UG/ML
INJECTION, SOLUTION INFILTRATION; PERINEURAL AS NEEDED
Status: DISCONTINUED | OUTPATIENT
Start: 2024-08-22 | End: 2024-08-22 | Stop reason: HOSPADM

## 2024-08-22 RX ORDER — ONDANSETRON 2 MG/ML
INJECTION INTRAMUSCULAR; INTRAVENOUS AS NEEDED
Status: DISCONTINUED | OUTPATIENT
Start: 2024-08-22 | End: 2024-08-22

## 2024-08-22 RX ORDER — PROMETHAZINE HYDROCHLORIDE 25 MG/ML
6.25 INJECTION, SOLUTION INTRAMUSCULAR; INTRAVENOUS ONCE AS NEEDED
Status: DISCONTINUED | OUTPATIENT
Start: 2024-08-22 | End: 2024-08-22 | Stop reason: HOSPADM

## 2024-08-22 RX ORDER — ACETAMINOPHEN 160 MG/5ML
650 SUSPENSION ORAL ONCE
Status: COMPLETED | OUTPATIENT
Start: 2024-08-22 | End: 2024-08-22

## 2024-08-22 RX ORDER — MIDAZOLAM HYDROCHLORIDE 2 MG/2ML
INJECTION, SOLUTION INTRAMUSCULAR; INTRAVENOUS AS NEEDED
Status: DISCONTINUED | OUTPATIENT
Start: 2024-08-22 | End: 2024-08-22

## 2024-08-22 RX ORDER — OXYMETAZOLINE HYDROCHLORIDE 0.05 G/100ML
SPRAY NASAL AS NEEDED
Status: DISCONTINUED | OUTPATIENT
Start: 2024-08-22 | End: 2024-08-22 | Stop reason: HOSPADM

## 2024-08-22 RX ORDER — MAGNESIUM HYDROXIDE 1200 MG/15ML
LIQUID ORAL AS NEEDED
Status: DISCONTINUED | OUTPATIENT
Start: 2024-08-22 | End: 2024-08-22 | Stop reason: HOSPADM

## 2024-08-22 RX ORDER — OXYCODONE HYDROCHLORIDE 5 MG/1
5 TABLET ORAL EVERY 4 HOURS PRN
Qty: 10 TABLET | Refills: 0 | Status: SHIPPED | OUTPATIENT
Start: 2024-08-22 | End: 2024-08-27

## 2024-08-22 RX ADMIN — FENTANYL CITRATE 50 MCG: 50 INJECTION INTRAMUSCULAR; INTRAVENOUS at 18:47

## 2024-08-22 RX ADMIN — DEXAMETHASONE SODIUM PHOSPHATE 10 MG: 10 INJECTION INTRAMUSCULAR; INTRAVENOUS at 15:53

## 2024-08-22 RX ADMIN — EPHEDRINE SULFATE 5 MG: 50 INJECTION, SOLUTION INTRAVENOUS at 16:52

## 2024-08-22 RX ADMIN — FENTANYL CITRATE 50 MCG: 50 INJECTION INTRAMUSCULAR; INTRAVENOUS at 18:39

## 2024-08-22 RX ADMIN — EPHEDRINE SULFATE 5 MG: 50 INJECTION, SOLUTION INTRAVENOUS at 16:27

## 2024-08-22 RX ADMIN — PROPOFOL 200 MG: 10 INJECTION, EMULSION INTRAVENOUS at 15:50

## 2024-08-22 RX ADMIN — ONDANSETRON 4 MG: 2 INJECTION INTRAMUSCULAR; INTRAVENOUS at 15:44

## 2024-08-22 RX ADMIN — ACETAMINOPHEN 650 MG: 650 SUSPENSION ORAL at 19:37

## 2024-08-22 RX ADMIN — MIDAZOLAM 2 MG: 1 INJECTION INTRAMUSCULAR; INTRAVENOUS at 15:44

## 2024-08-22 RX ADMIN — CEFAZOLIN SODIUM 2000 MG: 2 SOLUTION INTRAVENOUS at 15:44

## 2024-08-22 RX ADMIN — FENTANYL CITRATE 50 MCG: 50 INJECTION INTRAMUSCULAR; INTRAVENOUS at 15:50

## 2024-08-22 RX ADMIN — ROCURONIUM BROMIDE 50 MG: 10 INJECTION, SOLUTION INTRAVENOUS at 15:51

## 2024-08-22 RX ADMIN — SUGAMMADEX 200 MG: 100 INJECTION, SOLUTION INTRAVENOUS at 18:04

## 2024-08-22 RX ADMIN — LIDOCAINE HYDROCHLORIDE 60 MG: 20 INJECTION, SOLUTION EPIDURAL; INFILTRATION; INTRACAUDAL at 15:50

## 2024-08-22 RX ADMIN — SODIUM CHLORIDE 125 ML/HR: 0.9 INJECTION, SOLUTION INTRAVENOUS at 12:59

## 2024-08-22 RX ADMIN — EPHEDRINE SULFATE 5 MG: 50 INJECTION, SOLUTION INTRAVENOUS at 17:03

## 2024-08-22 RX ADMIN — FENTANYL CITRATE 50 MCG: 50 INJECTION INTRAMUSCULAR; INTRAVENOUS at 16:12

## 2024-08-22 RX ADMIN — OXYCODONE HYDROCHLORIDE 5 MG: 5 SOLUTION ORAL at 19:37

## 2024-08-22 RX ADMIN — SODIUM CHLORIDE: 0.9 INJECTION, SOLUTION INTRAVENOUS at 17:18

## 2024-08-22 NOTE — ANESTHESIA PREPROCEDURE EVALUATION
Procedure:  REPAIR VESTIBULAR STENOSIS (Nose)  SEPTOPLASTY (Nose)    Relevant Problems   PULMONARY   (+) Asthma   (+) Mild intermittent asthma without complication        Physical Exam    Airway    Mallampati score: I  TM Distance: >3 FB  Neck ROM: full     Dental       Cardiovascular  Cardiovascular exam normal    Pulmonary  Pulmonary exam normal     Other Findings        Anesthesia Plan  ASA Score- 2     Anesthesia Type- general with ASA Monitors.         Additional Monitors:     Airway Plan: ETT.           Plan Factors-Exercise tolerance (METS): >4 METS.    Chart reviewed.   Existing labs reviewed. Patient summary reviewed.    Patient is a current smoker.  Patient instructed to abstain from smoking on day of procedure. Patient did not smoke on day of surgery.    Obstructive sleep apnea risk education given perioperatively.        Induction- intravenous.    Postoperative Plan- Plan for postoperative opioid use.     Perioperative Resuscitation Plan - Level 1 - Full Code.       Informed Consent- Anesthetic plan and risks discussed with patient.

## 2024-08-22 NOTE — OP NOTE
OPERATIVE REPORT  PATIENT NAME: Dae Julio    :  1990  MRN: [id number]  Pt Location: [facility] OR ROOM 03    SURGERY DATE: 2024    Surgeons and Role:     * Jose Chauhan MD - Primary     * Sanaz Bright MD - Assisting    PREOPERATIVE DIAGNOSES:  1. Nasal valve stenosis with obstruction  2. Septal deviation with nasal obstruction  3. Turbinate hypertrophy    POSTOPERATIVE DIAGNOSES:  Same    PROCEDURES:  1. Open repair of nasal stenosis  2. Septoplasty  3. (Septal/Ear/Rib) cartilage graft to nose  4. Bilateral submucous resection of inferior turbinates    SURGEON:  Jose Chauhan MD    ASSISTANTS: Sanaz Bright MD    ANESTHESIA:  General endotracheal    ESTIMATED BLOOD LOSS:  < 100 cc    FLUIDS:  Crystalloid    COMPLICATIONS:  None.    INDICATIONS FOR PROCEDURE:  This is a 34 y.o. year old male whom I've seen previously in consultation regarding severe nasal obstruction.  Risks and benefits of the above procedures were discussed at length, including but not limited to bleeding, infection, external or internal nasal scars, septal perforation, change in the shape of the nose, among others.  Patient understands and consents to proceed.    BODY OF REPORT:   The patient was brought to the procedure room, placed on the procedure table in supine position. General  anesthesia was induced without complication.  The table was rotated. The nose was injected with a mixture of 1% lidocaine with 1:100,000 of epinephrine in the tip, columella, dorsum, alar bases bilaterally, and septum bilaterally. An afrin-soaked pledget was placed in each nostril.  The face was prepped and draped in the usual sterile fashion. The eyelids were gently taped sterilely after removing any prep from the lid skin.  A standard timeout was taken to confirm patient name, procedures, sidedness, allergies, medications administered among others.     Submucous resection of the inferior turbinates was performed bilaterally as follows:  The  anterior portion of the inferior turbinate was incised using electrocautery.  A submucoperiosteal dissection was performed using a Azeem elevator.  The anterior 1.5-2 cm of bone was removed with a Liseth forcep.  Mucosa was preserved.  The turbinate was outfractured using a Jelm elevator.    Septoplasty was performed as follows: A left-sided hemitransfixion incision was made with a #15 blade.  A submucoperichondrial plane was elevated posteriorly to the bony septum, inferiorly to the floor of the nose, and superiorly to the dorsum.  Dissection was performed around the anterocaudal septum in the same plane, and the opposite side was similarly exposed.    An inverted gull-wing incision was made at the mid-columellar level using a 6700 blade, in continuity with incisions 1-2 mm below the inferior margin of the lower lateral cartilages intranasally.  The nose was decorticated in a sub-SNAS plane using scissors, to the level of the rhinion.  At this level, a McKenty elevator was used to dissect subperiosteally to the nasofrontal suture.    Repair nasal stenosis was performed as follows:  The septum was so severely deviated that anterior septal reconstruction (ASR) was required to repair it and nasal stenosis.  The upper lateral cartilages were released from the septum using a D-knife or Hanover elevator.  The septum was incised from the keystone area anteriorly, preserving at least 1.5 cm of the keystone/bony junction.  The dorsal support strut tapered to approximatley 1.0 cm at its most anterior point.  The cartilage inferior to this was carefully removed by release along the bony-cartilaginous junction and maxillary crest.  Bony deviations posterior to this were removed with care taken not to torque on the skull base.     The septal cartilage was fashioned into an ASR graft such that it extended from the maxillary spine to the dorsal strut.  The straightest possible portion was used.  The maxillary spine was  cleared using cautery and split to a depth of about 2-3 mm using a straight osteotome.  The ASR graft was placed in this groove and then sutured to the dorsal strut on its left side using 5-0 prolene.  This also acted as a  graft.  The medial crura were repaired to the strut and tip support was found to be excellent.  The upper lateral cartilages were repaired to the dorsum using the same suture.    Tongue-in-groove setback of the medial crura was achieved to support the tip and adjust rotation, using 5-0 Prolene.    He  was checked for contour and tip support.  He  was found to have good contour and symmetry from all angles, including lateral, frontal and base view.  Tip support was good.  The hemitransfixion incision was closed using multiple interrupted 5-0 chromic sutures.  The septal space was closed using a mattressed 4-0 plane gut suture on a Thiago needle.  The skin flap was replaced and contour verified.  The columella was closed using multiple interrupted 6-0 Prolene sutures.  Intranasal incisions were carefully sutured using  5-0 chromic gut suture.  Bilateral Jasmine splints were placed, each coated in bacitracin.  A 4-0 nylon was used to secure this through the septum anteriorly.  An external thermoplastic splint was placed after taping the nose.    The patient was returned to the care of Anesthesia, extubated without difficulty, and taken to the recovery area in stable condition. All instruments and sponge counts were correct at the end of the procedure. IJose MD, was present for and performed all key elements of the procedure.      Patient Disposition:  PACU  and extubated and stable        SIGNATURE: Jose Chauhan MD  DATE: August 22, 2024  TIME: 6:12 PM

## 2024-08-22 NOTE — DISCHARGE INSTR - AVS FIRST PAGE
Jose Chauhan M.D.  Facial Plastic & Reconstructive Surgery   Rhinoplasty Post-Operative Care  Office [phone]  Cell [phone]               At Home (in the days immediately following the procedure):     Try to sleep with your head elevated on 2-3 pillows   You may experience moderate bleeding from the nose--this is normal. The gauze dressing under your nose may be changed as necessary.   Iced packs placed over the eyes will help reduce swelling.  They should be used 20 minutes on/ 20 minutes off while awake for the first full day.  Crushed ice in ziplock bags or frozen peas or corn work well.    The outside and half inch inside each nostril may be cleaned with a Q-tip soaked in hydrogen peroxide.     Apply antibiotic ointment (Bacitracin) or Vaseline to the first half inch inside your nose and the external incision 3-4 times per day.   Take your medicines as prescribed.  REMEMBER:  DO NOT DRIVE WHILE TAKING PAIN MEDICATIONS.   You may shower with luke-warm water only (avoid getting the cast wet).   Do not blow your nose for 7-10 days.  If you need to sneeze, do so with an open mouth.   You may use ibuprofen (Motrin, Advil) and acetaminophen (Tyelnol) for pain control in addition to any prescribed pain medications.     You may get out of bed and go to the bathroom with assistance.  Bleeding should decrease over the first 24 hours; you may have bleeding when changing positions quickly.  You should keep the rigid dressing covering the bridge of your nose as dry as possible.  Your eyes may even swell shut.  Eat light, soft meals as tolerated, avoiding gas-stimulating foods.     Follow-up care:   Eat before coming to the office for post-operative visits.  Rest for the first week after the procedure, avoiding excessive physical activities, hard chewing, lifting objects over 8 lbs (about the weight of a phone book), or bending over. .  Be very gentle when brushing your upper teeth as they may be numb after  surgery. We request that you do not travel by plane for one week after surgery.  Lubricate your nose as directed with Q-tips and Vaseline to soften hardened crusts.  You can expect some light blood-tinged drainage from your nose for several days.  If your nose begins to bleed copiously, spray or instill Afrin (generic = Oxymetazoline HCL) nose drops into the nostril that is bleeding and wait.  If the bleeding continues for 5 minutes or clots expel and/or you begin to swallow blood, please call our office or on call surgeon at the numbers below.  If there is excessive pain, high fever (greater than 101.5oF), or if you injure your nose, please call our office or on call surgeon at the numbers below.     Follow-up visits:    At one week, the cast/splints will be removed; you may drive yourself to this appointment (as long as you are no longer taking prescription/narcotic pain medicines).  After cast removal, make-up may be worn, avoiding the incision lines. Additional follow-up visits will be scheduled at this time.  Note that final results may not be apparent until ONE YEAR after surgery.    Healing Care:   After the cast is removed, avoid striking or bumping your nose; try not to roll onto it while asleep.  Clean the external nasal skin gently but thoroughly with soap. The use of alcohol and tobacco products prolong swelling and healing and are best avoided for 2 weeks after surgery.   Do not expose your nose to sun for 4-6 weeks after surgery.  Use sunscreen (SPF 30 or higher) for 6 months after surgery if sun exposure is absolutely necessary.  Avoid any physical exercise that can cause over-heating or over-exertion for two weeks after the surgery.  Your nose may be swollen and stuffy for several months.  Complete healing may take 12 months.  It is to your advantage to return for all postoperative visits so that long-term results may be evaluated.      Frequently Asked Questions:  When can I shower and shampoo my  hair?   You may shower the day after your surgery, BUT KEEP THE NASAL CAST DRY.  This may mean you wash your face/hair in the sink instead.  It is important that you do not use hot water, as this can increase the swelling.  Lukewarm water is best.      When will the swelling and bruising go away?   This usually takes 7-10 days or so, but may take less or more time, depending on the individual.    When can I take aspirin?   You should not take aspirin for 2 weeks prior to or after surgery. The same is true for vitamin E, ginko, garlic pills, and other “natural” supplements.    When can I take ibuprofen?      Non-steroidal anti-inflammatory drugs such as advil (ibuprofen), alleve (naproxen), or other similar may be used immediately after surgery as per the guidelines on the package.      When can I wear my glasses?   You will be able to wear contact lenses as soon as you feel comfortable.  You may wear glasses one to two weeks after surgery, depending on the type of rhinoplasty you had.  In any case, you must be careful not to place any pressure on the glasses (and thus your nose).     When can I wear makeup?   Make-up can be applied after cast removal, but not directly on the incisions until 3 weeks after the procedure.    When will I see my results?   Final results in rhinoplasty can take 12 months.  However, notable changes should be evident in the weeks immediately after the procedure.  Typically, the 1-2 mm of residual swelling is what takes a variable amount of time to resolve, and can make subtle but significant differences in nasal shape.      What about exercise?   Please adhere to the following schedule:   Up to week 1 after surgery:  REST!  No strenuous exercise.  Walking is ok.  Week 1-3 after surgery:  You may begin light aerobic exercise, but no bending over/straining/lifting weights.  Week 3+:  You may begin more strenuous exercise, such as yoga, stretching, bending over, lifting weights.  Please  remember to start slowly.  Week 6+:  You may resume contact sports, such as soccer, basketball, etc    How to contact us:    Phone:  If you have questions or concerns, please call us at [phone] during business hours (8 am to 5 pm).  On nights and weekends, you may page the ENT surgeon on call  at [facility].  In case of emergency, please call [phone].

## 2024-08-22 NOTE — ANESTHESIA POSTPROCEDURE EVALUATION
Post-Op Assessment Note    CV Status:  Stable  Pain Score: 0    Pain management: adequate       Mental Status:  Alert and awake   Hydration Status:  Euvolemic   PONV Controlled:  Controlled   Airway Patency:  Patent  Airway: intubated     Post Op Vitals Reviewed: Yes    No anethesia notable event occurred.    Staff: CRNA               BP   135/78   Temp   98   Pulse  78   Resp   14   SpO2   97% RA

## 2024-09-20 ENCOUNTER — TELEMEDICINE (OUTPATIENT)
Dept: PULMONOLOGY | Facility: CLINIC | Age: 34
End: 2024-09-20
Payer: COMMERCIAL

## 2024-09-20 DIAGNOSIS — R10.13 DYSPEPSIA: ICD-10-CM

## 2024-09-20 DIAGNOSIS — J45.20 MILD INTERMITTENT ASTHMA WITHOUT COMPLICATION: Primary | ICD-10-CM

## 2024-09-20 PROCEDURE — 99442 PR PHYS/QHP TELEPHONE EVALUATION 11-20 MIN: CPT

## 2024-09-20 NOTE — PROGRESS NOTES
Telephone Note - Pulmonary Medicine   Dae Julio 34 y.o. male MRN: [id number]    This Visit is being completed by telephone. The Patient is located at Home and in the following state in which I hold an active license [place]    The patient was identified by name and date of birth. Dae Julio was informed that this is a telemedicine visit and that the visit is being conducted through Telephone.  My office door was closed. No one else was in the room.  He acknowledged consent and understanding of privacy and security of the video platform. The patient has agreed to participate and understands they can discontinue the visit at any time.    Patient is aware this is a billable service.     Impression & Plan:   Dae Julio is a 34 y.o. male with PMHx of asthma who presents for follow-up.    Mild Intermittent Asthma without Exacerbation  Seasonal Allergies  Musculoskeletal Chest Pain  - The patient has a previous history of asthma dating back to childhood, but had improvement in his symptoms upon reaching adulthood.  In the last year he has had episodes of dyspnea which appear to be correlating with musculoskeletal chest pain that is potentially related to costochondritis and is reproducible on exam.  He had been titrated up to Dulera 200 mcg, but was not getting any benefit and there was concern that his symptoms were not related to his asthma and at last visit he was titrated down to 100 mcg.  He has not noticed any worsening of his symptoms with reduction in Dulera.  - Continue with Dulera 100 mcg 2 puffs twice daily, can consider changing this to as needed.  - Will check MCT.  - He can continue to use albuterol HFA as needed  - Continue with cetirizine, singuliar, flonase and ipratropium nasal spray for allergic/nasal symptoms.  -     Methacholine challenge; Future    Suspected Sleep Apnea  - As noted previously, concern for sleep apnea.  PSG is pending, will reach out to the sleep center to assure he is on  "the cancellation list.    Dyspepsia  - Recommend continued follow up with GI, he notes symptoms have been worse recently despite taking  esomeprazole daily.    Return in about 6 months (around 3/20/2025).  ______________________________________________________________________    HPI:    Dae Julio is a 34 y.o. male with PMHx of asthma who presents for follow-up.  The patient was last seen in the office on 6/25/2024 at which time plan was for PFTs due to prior DLCO reduction on outside PFTs, decreased from Dulera 200 mcg to 100 mcg with continuation of albuterol HFA, cetirizine, Singulair, Flonase and ipratropium nasal spray and PSG.  PFTs were unremarkable with normal DLCO.  He did decrease from Dulera 200 mcg to 100 mcg and reports he did not notice any worsening of his dyspnea with this change.  He states he cannot recall when he last used the albuterol HFA, and often does not use it as he does not notice much benefit.  He does note since his last visit he has had worsening of his reflux symptoms and also had a septoplasty with ENT due to nasal obstruction and deviated septum.  Since his procedure he has had some improvement in his breathing, but is still getting the episodes of chest wall pain with \"clicking\" in his sternum with movement that is associated with dyspnea both at rest and with exertion.  He did schedule his PSG, but it is not until 3/2025, he does think he is on the cancellation list.    Current Medications:    Current Outpatient Medications:     albuterol (PROVENTIL HFA,VENTOLIN HFA) 90 mcg/act inhaler, Inhale 2 puffs every 6 (six) hours as needed for wheezing, Disp: , Rfl:     cetirizine (ZyrTEC) 10 mg tablet, Take 10 mg by mouth daily, Disp: , Rfl:     esomeprazole (NexIUM) 40 MG capsule, Take 40 mg by mouth daily before breakfast, Disp: , Rfl:     fluticasone (FLONASE) 50 mcg/act nasal spray, 2 sprays into each nostril daily, Disp: , Rfl:     ipratropium (ATROVENT) 0.06 % nasal spray, 2 " sprays into each nostril 3 (three) times a day, Disp: 15 mL, Rfl: 3    meclizine (ANTIVERT) 25 mg tablet, Take 0.5 tablets (12.5 mg total) by mouth 3 (three) times a day as needed for dizziness, Disp: 30 tablet, Rfl: 0    methocarbamol (ROBAXIN) 500 mg tablet, Take 1 tablet (500 mg total) by mouth 2 (two) times a day (Patient not taking: Reported on 1/7/2021), Disp: 20 tablet, Rfl: 0    mometasone-formoterol (Dulera) 100-5 MCG/ACT inhaler, Inhale 2 puffs 2 (two) times a day Rinse mouth after use., Disp: 13 g, Rfl: 2    montelukast (SINGULAIR) 10 mg tablet, TAKE 1 TABLET BY MOUTH EVERY DAY AT NIGHT, Disp: , Rfl:     NON FORMULARY, Medical Marijuana, Disp: , Rfl:     predniSONE 10 mg tablet, Take 40 mg daily for 3 days. Then take 30 mg daily for 3 days. Then take 20 mg daily for 3 days. Then take 10 mg daily for 3 days. Then stop. (Patient not taking: Reported on 8/19/2024), Disp: 30 tablet, Rfl: 0    Review of Systems:  Review of Systems  10-point system review completed, all of which are negative except as mentioned above.    Past medical history, surgical history, and family history were reviewed and updated as appropriate    Social history updates:  Social History     Tobacco Use   Smoking Status Some Days    Types: Cigarettes   Smokeless Tobacco Never   Tobacco Comments    Smokes Medical Marijuana-- Patient does not smoke tobacco.  Last smoked 1 month ago      Diagnostic Data:  Labs:  I personally reviewed the most recent laboratory data pertinent to today's visit    Lab Results   Component Value Date    WBC 9.75 07/09/2024    HGB 13.1 07/09/2024    HCT 38.6 07/09/2024    MCV 87 07/09/2024     07/09/2024     Lab Results   Component Value Date    SODIUM 136 07/09/2024    K 4.0 07/09/2024    CO2 25 07/09/2024     07/09/2024    BUN 13 07/09/2024    CREATININE 0.98 07/09/2024    CALCIUM 9.7 07/09/2024     PFT results:  The most recent pulmonary function tests were reviewed.  PFTs w/ BD -- 7/24/2024  FVC:  "  5.05L  96% predicted        Z-score: .-0.28   FEV1: 3.94L  91% predicted       Z-score: -0.68   FEV1/FVC Ratio:                        Z-score: -1      After administration of bronchodilator:  FVC:     5.27L   +4 % change   FEV1:   4.18L   +6 % change      Lung volumes by body plethysmography:  Total Lung Capacity:    7.46L    Z-score: 0.96   Residual volume:          2.23L    % predicted: 138  RV/TLC Ratio:               124 %                      DLCO corrected for patients hemoglobin level: 27.14, 86% predicted  Z-score: -1.02     PFTs -- 4/18/2023 ([facility])  FVC 4.07L 107%  FEV1 3.34L 100%  FEV1/FVC 77  TLC and RV were reported to be normal  DLCO was reported to be mildly reduced    Imaging:  I personally reviewed the images in PACS pertinent to today's visit:  CXR 2 View -- 3/4/2024  No acute cardiopulmonary disease.    Other studies:  None    Bev Diallo MD  Pulmonary-Critical Care and Sleep Medicine  09/23/24    Portions of the record may have been created with voice recognition software. Occasional wrong word or \"sound a like\" substitutions may have occurred due to the inherent limitations of voice recognition software. Please read the chart carefully and recognize, using context, where substitutions have occurred.    Visit Time  Total Visit Duration: 20 min    "

## 2024-09-23 NOTE — PATIENT INSTRUCTIONS
- Check asthma breathing test (called a methacholine challenge)  - Please have your sleep study completed  - Continue with dulera 100mcg, 2 puffs twice daily  - Continue with albuterol as needed

## 2024-09-24 ENCOUNTER — TELEPHONE (OUTPATIENT)
Age: 34
End: 2024-09-24

## 2024-09-24 ENCOUNTER — OFFICE VISIT (OUTPATIENT)
Age: 34
End: 2024-09-24
Payer: COMMERCIAL

## 2024-09-24 VITALS
HEIGHT: 69 IN | TEMPERATURE: 98.7 F | OXYGEN SATURATION: 99 % | DIASTOLIC BLOOD PRESSURE: 84 MMHG | BODY MASS INDEX: 32.08 KG/M2 | SYSTOLIC BLOOD PRESSURE: 128 MMHG | HEART RATE: 80 BPM | WEIGHT: 216.6 LBS

## 2024-09-24 DIAGNOSIS — R10.13 DYSPEPSIA: ICD-10-CM

## 2024-09-24 DIAGNOSIS — K21.9 GASTROESOPHAGEAL REFLUX DISEASE WITHOUT ESOPHAGITIS: Primary | ICD-10-CM

## 2024-09-24 PROCEDURE — 99214 OFFICE O/P EST MOD 30 MIN: CPT | Performed by: STUDENT IN AN ORGANIZED HEALTH CARE EDUCATION/TRAINING PROGRAM

## 2024-09-24 NOTE — PROGRESS NOTES
[facility]  Outpatient Follow-up  Encounter: [id number]    PATIENT INFO     Name: Dae uJlio  YOB: 1990   Age: 34 y.o.   Sex: male   MRN: [id number]    ASSESSMENT & PLAN     1. Gastroesophageal reflux disease without esophagitis  2. Dyspepsia     Patient was last seen 6/7/24 for RUQ pain and dyspepsia. Not responsive to omeprazole and Pepcid. Calproctectin negative. H. Pylori stool antigen negative. Pancreatic elastase normal. RUQ ultrasound negative for gallstones. HIDA scan normal with normal contractile response to CCK. Endoscopic evaluation in May 2024 was largely unremarkable with no evidence of hiatal hernia.  He also had esophageal manometry which was normal. He remains on Nexium 40 mg daily without significant relief of symptoms; still complains of dyspepsia and reflux symptoms described as both heartburn and regurgitation. He may have refractory GERD (he reports no improvement with BID dosing of Nexium). Must consider GERD with non-acid reflux versus functional dyspepsia/heartburn.  This is less likely to represent gastroparesis as he denies any symptoms of early satiety, nausea/vomiting; however it is certainly still possible.    Plan:  Schedule outpatient EGD with Bravo pH testing (92 hrs) off PPI for 7 days  Patient may benefit from TCA in future if Bravo testing does not demonstrate acid reflux as the etiology of symptoms     Orders Placed This Encounter   Procedures    EGD    Bravo pH Monitoring (must also order EGD separate)     FOLLOW-UP: 3 months     HISTORY OF PRESENT ILLNESS       Dae Julio is a 34 y.o. male with a past medical history of seasonal allergies who presents to GI office for follow-up for GERD and dyspepsia.  Since last office visit, patient reports no significant improvement in symptoms on Nexium.  He continues to report right upper quadrant and epigastric abdominal pain as well as reflux symptoms.  He does report that his  reflux symptoms are significantly worse if he misses a dose of his PPI.  He is not taking Pepcid at this time.  He describes his reflux symptoms as both heartburn and regurgitation.  He denies any dysphagia, odynophagia, weight loss, vomiting, or other warning signs. he denies early satiety.     ENDOSCOPIC HISTORY     UPPER ENDOSCOPY: May 2024 showed irregular Z-line, otherwise unremarkable with no evidence of hiatal hernia.  COLONOSCOPY: None prior    REVIEW OF SYSTEMS     ROS negative other than stated above    Historical Information   Past Medical History:   Diagnosis Date    Anxiety     Asthma     Costochondral chest pain     GERD (gastroesophageal reflux disease)     History of tonsillectomy and adenoidectomy     Shortness of breath     Sleep apnea     pt states this has resolved as an adult     Past Surgical History:   Procedure Laterality Date    FINGER SURGERY      TN REPAIR NASAL VESTIBULAR STENOSIS N/A 8/22/2024    Procedure: REPAIR VESTIBULAR STENOSIS;  Surgeon: Jose Chauhan MD;  Location: [facility];  Service: ENT    TN SEPTOPLASTY/SUBMUCOUS RESECJ W/WO CARTILAGE GRF N/A 8/22/2024    Procedure: SEPTOPLASTY;  Surgeon: Jose Chauhan MD;  Location: [facility];  Service: ENT    SEPTOPLASTY      TONSILLECTOMY      TYMPANOSTOMY TUBE PLACEMENT       Social History   Social History     Substance and Sexual Activity   Alcohol Use Yes    Comment: socially     Social History     Substance and Sexual Activity   Drug Use Yes    Types: Marijuana    Comment: medical maijuana used edible last 2100 8.21.24     Social History     Tobacco Use   Smoking Status Some Days    Types: Cigarettes   Smokeless Tobacco Never   Tobacco Comments    Smokes Medical Marijuana-- Patient does not smoke tobacco.  Last smoked 1 month ago      History reviewed. No pertinent family history.      MEDICATIONS AND ALLERGIES     Current Outpatient Medications   Medication Instructions    albuterol (PROVENTIL HFA,VENTOLIN HFA) 90 mcg/act  "inhaler 2 puffs, Inhalation, Every 6 hours PRN    cetirizine (ZYRTEC) 10 mg, Oral, Daily    esomeprazole (NEXIUM) 40 mg, Oral, Daily before breakfast    fluticasone (FLONASE) 50 mcg/act nasal spray 2 sprays, Nasal, Daily    ipratropium (ATROVENT) 0.06 % nasal spray 2 sprays, Nasal, 3 times daily    meclizine (ANTIVERT) 12.5 mg, Oral, 3 times daily PRN    methocarbamol (ROBAXIN) 500 mg, Oral, 2 times daily    mometasone-formoterol (Dulera) 100-5 MCG/ACT inhaler 2 puffs, Inhalation, 2 times daily, Rinse mouth after use.    montelukast (SINGULAIR) 10 mg tablet TAKE 1 TABLET BY MOUTH EVERY DAY AT NIGHT    NON FORMULARY Medical Marijuana     predniSONE 10 mg tablet Take 40 mg daily for 3 days. Then take 30 mg daily for 3 days. Then take 20 mg daily for 3 days. Then take 10 mg daily for 3 days. Then stop.     Allergies   Allergen Reactions    Penicillins Other (See Comments)     Family hx and patient has not had a reaction himself that he knows of       PHYSICAL EXAM      Objective   Blood pressure 128/84, pulse 80, temperature 98.7 °F (37.1 °C), temperature source Temporal, height 5' 9\" (1.753 m), weight 98.2 kg (216 lb 9.6 oz), SpO2 99%. Body mass index is 31.99 kg/m².    General Appearance:   Alert, cooperative, no distress   HEENT:   Normocephalic, atraumatic, anicteric     Neck:   Supple, symmetrical, trachea midline   Lungs:   Equal chest rise, respirations unlabored    Heart:   Regular rate and rhythm   Abdomen:   Soft, non-tender, non-distended; normal bowel sounds; no masses, no organomegaly    Rectal:   Deferred    Extremities:   No cyanosis, clubbing or edema    Neuro:   No obvious focal deficits, moves extremities spontaneously    Skin:   No visible jaundice, rashes, or lesions      LABORATORY RESULTS     No visits with results within 1 Day(s) from this visit.   Latest known visit with results is:   Appointment on 07/18/2024   Component Date Value    H. Pylori Stool Antigen 07/18/2024 Negative     " Calprotectin, Fecal 07/18/2024 37.9     Pancreatic Elastase, Fec* 07/18/2024 >800.0      No results found.    RADIOLOGY RESULTS: I have personally reviewed pertinent imaging studies.      Sherry Stoll DO  Gastroenterology Fellow  [facility]  Division of Gastroenterology & Hepatology    ** Please Note: This note is constructed using a voice recognition dictation system. **

## 2024-09-24 NOTE — H&P (VIEW-ONLY)
[facility]  Outpatient Follow-up  Encounter: [id number]    PATIENT INFO     Name: Dae Julio  YOB: 1990   Age: 34 y.o.   Sex: male   MRN: [id number]    ASSESSMENT & PLAN     1. Gastroesophageal reflux disease without esophagitis  2. Dyspepsia     Patient was last seen 6/7/24 for RUQ pain and dyspepsia. Not responsive to omeprazole and Pepcid. Calproctectin negative. H. Pylori stool antigen negative. Pancreatic elastase normal. RUQ ultrasound negative for gallstones. HIDA scan normal with normal contractile response to CCK. Endoscopic evaluation in May 2024 was largely unremarkable with no evidence of hiatal hernia.  He also had esophageal manometry which was normal. He remains on Nexium 40 mg daily without significant relief of symptoms; still complains of dyspepsia and reflux symptoms described as both heartburn and regurgitation. He may have refractory GERD (he reports no improvement with BID dosing of Nexium). Must consider GERD with non-acid reflux versus functional dyspepsia/heartburn.  This is less likely to represent gastroparesis as he denies any symptoms of early satiety, nausea/vomiting; however it is certainly still possible.    Plan:  Schedule outpatient EGD with Bravo pH testing (92 hrs) off PPI for 7 days  Patient may benefit from TCA in future if Bravo testing does not demonstrate acid reflux as the etiology of symptoms     Orders Placed This Encounter   Procedures    EGD    Bravo pH Monitoring (must also order EGD separate)     FOLLOW-UP: 3 months     HISTORY OF PRESENT ILLNESS       Dae Julio is a 34 y.o. male with a past medical history of seasonal allergies who presents to GI office for follow-up for GERD and dyspepsia.  Since last office visit, patient reports no significant improvement in symptoms on Nexium.  He continues to report right upper quadrant and epigastric abdominal pain as well as reflux symptoms.  He does report that his  reflux symptoms are significantly worse if he misses a dose of his PPI.  He is not taking Pepcid at this time.  He describes his reflux symptoms as both heartburn and regurgitation.  He denies any dysphagia, odynophagia, weight loss, vomiting, or other warning signs. he denies early satiety.     ENDOSCOPIC HISTORY     UPPER ENDOSCOPY: May 2024 showed irregular Z-line, otherwise unremarkable with no evidence of hiatal hernia.  COLONOSCOPY: None prior    REVIEW OF SYSTEMS     ROS negative other than stated above    Historical Information   Past Medical History:   Diagnosis Date    Anxiety     Asthma     Costochondral chest pain     GERD (gastroesophageal reflux disease)     History of tonsillectomy and adenoidectomy     Shortness of breath     Sleep apnea     pt states this has resolved as an adult     Past Surgical History:   Procedure Laterality Date    FINGER SURGERY      AZ REPAIR NASAL VESTIBULAR STENOSIS N/A 8/22/2024    Procedure: REPAIR VESTIBULAR STENOSIS;  Surgeon: Jose Chauhan MD;  [facility];  Service: ENT    AZ SEPTOPLASTY/SUBMUCOUS RESECJ W/WO CARTILAGE GRF N/A 8/22/2024    Procedure: SEPTOPLASTY;  Surgeon: Jose Chauhan MD;  Location: [facility];  Service: ENT    SEPTOPLASTY      TONSILLECTOMY      TYMPANOSTOMY TUBE PLACEMENT       Social History   Social History     Substance and Sexual Activity   Alcohol Use Yes    Comment: socially     Social History     Substance and Sexual Activity   Drug Use Yes    Types: Marijuana    Comment: medical maijuana used edible last 2100 8.21.24     Social History     Tobacco Use   Smoking Status Some Days    Types: Cigarettes   Smokeless Tobacco Never   Tobacco Comments    Smokes Medical Marijuana-- Patient does not smoke tobacco.  Last smoked 1 month ago      History reviewed. No pertinent family history.      MEDICATIONS AND ALLERGIES     Current Outpatient Medications   Medication Instructions    albuterol (PROVENTIL HFA,VENTOLIN HFA) 90 mcg/act  "inhaler 2 puffs, Inhalation, Every 6 hours PRN    cetirizine (ZYRTEC) 10 mg, Oral, Daily    esomeprazole (NEXIUM) 40 mg, Oral, Daily before breakfast    fluticasone (FLONASE) 50 mcg/act nasal spray 2 sprays, Nasal, Daily    ipratropium (ATROVENT) 0.06 % nasal spray 2 sprays, Nasal, 3 times daily    meclizine (ANTIVERT) 12.5 mg, Oral, 3 times daily PRN    methocarbamol (ROBAXIN) 500 mg, Oral, 2 times daily    mometasone-formoterol (Dulera) 100-5 MCG/ACT inhaler 2 puffs, Inhalation, 2 times daily, Rinse mouth after use.    montelukast (SINGULAIR) 10 mg tablet TAKE 1 TABLET BY MOUTH EVERY DAY AT NIGHT    NON FORMULARY Medical Marijuana     predniSONE 10 mg tablet Take 40 mg daily for 3 days. Then take 30 mg daily for 3 days. Then take 20 mg daily for 3 days. Then take 10 mg daily for 3 days. Then stop.     Allergies   Allergen Reactions    Penicillins Other (See Comments)     Family hx and patient has not had a reaction himself that he knows of       PHYSICAL EXAM      Objective   Blood pressure 128/84, pulse 80, temperature 98.7 °F (37.1 °C), temperature source Temporal, height 5' 9\" (1.753 m), weight 98.2 kg (216 lb 9.6 oz), SpO2 99%. Body mass index is 31.99 kg/m².    General Appearance:   Alert, cooperative, no distress   HEENT:   Normocephalic, atraumatic, anicteric     Neck:   Supple, symmetrical, trachea midline   Lungs:   Equal chest rise, respirations unlabored    Heart:   Regular rate and rhythm   Abdomen:   Soft, non-tender, non-distended; normal bowel sounds; no masses, no organomegaly    Rectal:   Deferred    Extremities:   No cyanosis, clubbing or edema    Neuro:   No obvious focal deficits, moves extremities spontaneously    Skin:   No visible jaundice, rashes, or lesions      LABORATORY RESULTS     No visits with results within 1 Day(s) from this visit.   Latest known visit with results is:   Appointment on 07/18/2024   Component Date Value    H. Pylori Stool Antigen 07/18/2024 Negative     " Calprotectin, Fecal 07/18/2024 37.9     Pancreatic Elastase, Fec* 07/18/2024 >800.0      No results found.    RADIOLOGY RESULTS: I have personally reviewed pertinent imaging studies.      Sherry Stoll DO  Gastroenterology Fellow  [facility]  Division of Gastroenterology & Hepatology    ** Please Note: This note is constructed using a voice recognition dictation system. **

## 2024-09-24 NOTE — TELEPHONE ENCOUNTER
Can someone please check the scheduling of this patient's EGD with BRAVO? Provider is requesting with Dr. Henderson. [facility] would work best for patient but availability did not work with patient's schedule. He is now scheduled for [place]. Do they do them there? I did not get any scheduling warning like I did when trying to schedule at [place]. Also according to my notes, I was to call the [facility] to coordinate procedure but [facility] told me I was wrong.  Thank you in advance.

## 2024-09-24 NOTE — PATIENT INSTRUCTIONS
Stop taking Nexium a week before Bravo (pH) study   Based on the results of the study we will decide how to manage your acid reflux   We may start a medication called a TCA down the road if it does not seem like your symptoms are from acid reflux based on these studies  Follow-up in 3 months

## 2024-09-27 ENCOUNTER — TELEPHONE (OUTPATIENT)
Age: 34
End: 2024-09-27

## 2024-09-27 DIAGNOSIS — J45.20 MILD INTERMITTENT ASTHMA WITHOUT COMPLICATION: ICD-10-CM

## 2024-09-27 RX ORDER — MOMETASONE FUROATE AND FORMOTEROL FUMARATE DIHYDRATE 100; 5 UG/1; UG/1
2 AEROSOL RESPIRATORY (INHALATION) 2 TIMES DAILY
Qty: 13 G | Refills: 5 | Status: SHIPPED | OUTPATIENT
Start: 2024-09-27

## 2024-09-27 NOTE — TELEPHONE ENCOUNTER
Reason for call:   [x] Refill   [] Prior Auth  [] Other:     Office: [facility]   [] PCP/Provider -   [x] Specialty/Provider - Pulmonology/ Bev Diallo     Medication: mometasone-formoterol (Dulera)     Dose/Frequency: 100-5 MCG/ACT inhaler/ Inhale 2 puffs 2 (two) times a day Rinse mouth after use     Quantity: 13 g    Pharmacy: [facility]     Does the patient have enough for 3 days?   [] Yes   [x] No - Send as HP to POD

## 2024-09-27 NOTE — TELEPHONE ENCOUNTER
Pt calls in and states that there was a test ordered by  and wanted to know how to go about scheduling it. Informed him he would have to call central scheduling. Provided there phone number. Pt verbalizes understanding and gives thanks

## 2024-10-07 ENCOUNTER — PATIENT MESSAGE (OUTPATIENT)
Dept: GASTROENTEROLOGY | Facility: CLINIC | Age: 34
End: 2024-10-07

## 2024-10-09 ENCOUNTER — HOSPITAL ENCOUNTER (EMERGENCY)
Facility: HOSPITAL | Age: 34
Discharge: HOME | End: 2024-10-09
Attending: EMERGENCY MEDICINE
Payer: COMMERCIAL

## 2024-10-09 VITALS
HEART RATE: 67 BPM | SYSTOLIC BLOOD PRESSURE: 124 MMHG | DIASTOLIC BLOOD PRESSURE: 75 MMHG | RESPIRATION RATE: 18 BRPM | BODY MASS INDEX: 31.75 KG/M2 | WEIGHT: 215 LBS | TEMPERATURE: 97.7 F | OXYGEN SATURATION: 98 %

## 2024-10-09 DIAGNOSIS — K08.89 PAIN, DENTAL: Primary | ICD-10-CM

## 2024-10-09 PROCEDURE — 99282 EMERGENCY DEPT VISIT SF MDM: CPT

## 2024-10-09 PROCEDURE — 99284 EMERGENCY DEPT VISIT MOD MDM: CPT | Performed by: EMERGENCY MEDICINE

## 2024-10-09 RX ORDER — IBUPROFEN 400 MG/1
400 TABLET, FILM COATED ORAL EVERY 6 HOURS PRN
Qty: 30 TABLET | Refills: 0 | Status: SHIPPED | OUTPATIENT
Start: 2024-10-09

## 2024-10-09 RX ORDER — ACETAMINOPHEN 325 MG/1
975 TABLET ORAL ONCE
Status: COMPLETED | OUTPATIENT
Start: 2024-10-09 | End: 2024-10-09

## 2024-10-09 RX ORDER — CLINDAMYCIN HCL 150 MG
450 CAPSULE ORAL 3 TIMES DAILY
Qty: 63 CAPSULE | Refills: 0 | Status: SHIPPED | OUTPATIENT
Start: 2024-10-09 | End: 2024-10-16

## 2024-10-09 RX ORDER — CLINDAMYCIN HCL 150 MG
450 CAPSULE ORAL ONCE
Status: COMPLETED | OUTPATIENT
Start: 2024-10-09 | End: 2024-10-09

## 2024-10-09 RX ORDER — ACETAMINOPHEN 500 MG
500 TABLET ORAL EVERY 6 HOURS PRN
Qty: 30 TABLET | Refills: 0 | Status: SHIPPED | OUTPATIENT
Start: 2024-10-09

## 2024-10-09 RX ORDER — CHLORHEXIDINE GLUCONATE ORAL RINSE 1.2 MG/ML
15 SOLUTION DENTAL 2 TIMES DAILY
Qty: 120 ML | Refills: 0 | Status: SHIPPED | OUTPATIENT
Start: 2024-10-09

## 2024-10-09 RX ADMIN — ACETAMINOPHEN 975 MG: 325 TABLET ORAL at 02:58

## 2024-10-09 RX ADMIN — CLINDAMYCIN HYDROCHLORIDE 450 MG: 150 CAPSULE ORAL at 02:58

## 2024-10-09 NOTE — ED PROVIDER NOTES
Final diagnoses:   Pain, dental     ED Disposition       ED Disposition   Discharge    Condition   Stable    Date/Time   Wed Oct 9, 2024  2:45 AM    Comment   Dae Julio discharge to home/self care.                   Assessment & Plan       Medical Decision Making  I reviewed the patient's medical chart, PMHx, prior encounters, medications.    My DDx includes: Dental pain, dental infection, dental abscess    Will treat empirically with abx, recommend alternating between tylenol and motrin for improved pain control. Provide dental follow up.    Discharged with strict return precautions.    Risk  OTC drugs.  Prescription drug management.             Medications   clindamycin (CLEOCIN) capsule 450 mg (450 mg Oral Given 10/9/24 0258)   acetaminophen (TYLENOL) tablet 975 mg (975 mg Oral Given 10/9/24 0258)       ED Risk Strat Scores                           SBIRT 20yo+      Flowsheet Row Most Recent Value   Initial Alcohol Screen: US AUDIT-C     1. How often do you have a drink containing alcohol? 0 Filed at: 10/09/2024 0238   2. How many drinks containing alcohol do you have on a typical day you are drinking?  0 Filed at: 10/09/2024 0238   3a. Male UNDER 65: How often do you have five or more drinks on one occasion? 0 Filed at: 10/09/2024 0238   3b. FEMALE Any Age, or MALE 65+: How often do you have 4 or more drinks on one occassion? 0 Filed at: 10/09/2024 0238   Audit-C Score 0 Filed at: 10/09/2024 0238   FRANK: How many times in the past year have you...    Used an illegal drug or used a prescription medication for non-medical reasons? Never Filed at: 10/09/2024 0238                            History of Present Illness       Chief Complaint   Patient presents with    Dental Pain     Top left rear molar dental caries pt has appt on the 28th for extraction        Past Medical History:   Diagnosis Date    Anxiety     Asthma     Costochondral chest pain     GERD (gastroesophageal reflux disease)     History of  tonsillectomy and adenoidectomy     Shortness of breath     Sleep apnea     pt states this has resolved as an adult      Past Surgical History:   Procedure Laterality Date    FINGER SURGERY      [place] REPAIR NASAL VESTIBULAR STENOSIS N/A 8/22/2024    Procedure: REPAIR VESTIBULAR STENOSIS;  Surgeon: Jose Chauhan MD;  [facility];  Service: ENT    [place] SEPTOPLASTY/SUBMUCOUS RESECJ W/WO CARTILAGE GRF N/A 8/22/2024    Procedure: SEPTOPLASTY;  Surgeon: Jose Chauhan MD;  [facility];  Service: ENT    SEPTOPLASTY      TONSILLECTOMY      TYMPANOSTOMY TUBE PLACEMENT        History reviewed. No pertinent family history.   Social History     Tobacco Use    Smoking status: Some Days     Types: Cigarettes    Smokeless tobacco: Never    Tobacco comments:     Smokes Medical Marijuana-- Patient does not smoke tobacco.  Last smoked 1 month ago    Vaping Use    Vaping status: Never Used   Substance Use Topics    Alcohol use: Yes     Comment: socially    Drug use: Yes     Types: Marijuana     Comment: medical maijuana used edible last 2100 8.21.24      E-Cigarette/Vaping    E-Cigarette Use Never User       E-Cigarette/Vaping Substances    Nicotine No     THC No     CBD No     Flavoring No     Other No     Unknown No       I have reviewed and agree with the history as documented.     34-year-old male who presents for dental pain.  Patient reports that over the past week, he has had worsening pain in the left upper quadrant of his mouth due to a cracked tooth.  He denies any fevers or chills.  States that he has been taking aspirin at home with only mild relief.  He has an appointment for extraction of the tooth on 10/28, however in the meantime he is concerned he may need antibiotics given his worsening pain.  ROS otherwise negative        Review of Systems   Constitutional:  Negative for chills, diaphoresis, fatigue and fever.   HENT:  Positive for dental problem. Negative for congestion and sore throat.    Eyes:   Negative for visual disturbance.   Respiratory:  Negative for cough, chest tightness and shortness of breath.    Cardiovascular:  Negative for chest pain, palpitations and leg swelling.   Gastrointestinal:  Negative for abdominal distention, abdominal pain, constipation, diarrhea, nausea and vomiting.   Genitourinary:  Negative for difficulty urinating and dysuria.   Musculoskeletal:  Negative for arthralgias and myalgias.   Skin:  Negative for rash.   Neurological:  Negative for dizziness, weakness, light-headedness, numbness and headaches.   Psychiatric/Behavioral:  Negative for agitation, behavioral problems and confusion. The patient is not nervous/anxious.    All other systems reviewed and are negative.          Objective       ED Triage Vitals [10/09/24 0235]   Temperature Pulse Blood Pressure Respirations SpO2 Patient Position - Orthostatic VS   97.7 °F (36.5 °C) 67 124/75 18 98 % Sitting      Temp Source Heart Rate Source BP Location FiO2 (%) Pain Score    Temporal Monitor Right arm -- 10 - Worst Possible Pain      Vitals      Date and Time Temp Pulse SpO2 Resp BP Pain Score FACES Pain Rating User   10/09/24 0235 97.7 °F (36.5 °C) 67 98 % 18 124/75 10 - Worst Possible Pain -- RJP            Physical Exam  Constitutional:       Appearance: He is well-developed.   HENT:      Head: Normocephalic and atraumatic.      Mouth/Throat:      Dentition: Abnormal dentition. No dental tenderness or gingival swelling.        Comments: Cracked posterior left molar, no evidence of dental abscess, no gingival erythema.  Cardiovascular:      Rate and Rhythm: Normal rate and regular rhythm.      Heart sounds: Normal heart sounds. No murmur heard.  Pulmonary:      Effort: Pulmonary effort is normal. No respiratory distress.      Breath sounds: Normal breath sounds.   Abdominal:      General: Bowel sounds are normal. There is no distension.      Palpations: Abdomen is soft.      Tenderness: There is no abdominal tenderness.    Musculoskeletal:         General: No deformity.   Skin:     General: Skin is warm.      Findings: No rash.   Neurological:      Mental Status: He is alert and oriented to person, place, and time.   Psychiatric:         Behavior: Behavior normal.         Thought Content: Thought content normal.         Judgment: Judgment normal.         Results Reviewed       None            No orders to display       Procedures    ED Medication and Procedure Management   Prior to Admission Medications   Prescriptions Last Dose Informant Patient Reported? Taking?   NON FORMULARY  Self Yes No   Sig: Medical Marijuana   albuterol (PROVENTIL HFA,VENTOLIN HFA) 90 mcg/act inhaler  Self Yes No   Sig: Inhale 2 puffs every 6 (six) hours as needed for wheezing   cetirizine (ZyrTEC) 10 mg tablet   Yes No   Sig: Take 10 mg by mouth daily   esomeprazole (NexIUM) 40 MG capsule   Yes No   Sig: Take 40 mg by mouth daily before breakfast   fluticasone (FLONASE) 50 mcg/act nasal spray  Self Yes No   Si sprays into each nostril daily   ipratropium (ATROVENT) 0.06 % nasal spray   No No   Si sprays into each nostril 3 (three) times a day   meclizine (ANTIVERT) 25 mg tablet  Self No No   Sig: Take 0.5 tablets (12.5 mg total) by mouth 3 (three) times a day as needed for dizziness   methocarbamol (ROBAXIN) 500 mg tablet  Self No No   Sig: Take 1 tablet (500 mg total) by mouth 2 (two) times a day   Patient not taking: Reported on 2021   mometasone-formoterol (Dulera) 100-5 MCG/ACT inhaler   No No   Sig: Inhale 2 puffs 2 (two) times a day Rinse mouth after use.   montelukast (SINGULAIR) 10 mg tablet  Self Yes No   Sig: TAKE 1 TABLET BY MOUTH EVERY DAY AT NIGHT   predniSONE 10 mg tablet   No No   Sig: Take 40 mg daily for 3 days. Then take 30 mg daily for 3 days. Then take 20 mg daily for 3 days. Then take 10 mg daily for 3 days. Then stop.   Patient not taking: Reported on 2024      Facility-Administered Medications: None     Discharge  Medication List as of 10/9/2024  2:46 AM        START taking these medications    Details   acetaminophen (TYLENOL) 500 mg tablet Take 1 tablet (500 mg total) by mouth every 6 (six) hours as needed for mild pain, Starting Wed 10/9/2024, Normal      chlorhexidine (PERIDEX) 0.12 % solution Apply 15 mL to the mouth or throat 2 (two) times a day, Starting Wed 10/9/2024, Normal      clindamycin (CLEOCIN) 150 mg capsule Take 3 capsules (450 mg total) by mouth 3 (three) times a day for 7 days, Starting Wed 10/9/2024, Until Wed 10/16/2024, Normal      ibuprofen (MOTRIN) 400 mg tablet Take 1 tablet (400 mg total) by mouth every 6 (six) hours as needed for mild pain, Starting Wed 10/9/2024, Normal           CONTINUE these medications which have NOT CHANGED    Details   albuterol (PROVENTIL HFA,VENTOLIN HFA) 90 mcg/act inhaler Inhale 2 puffs every 6 (six) hours as needed for wheezing, Historical Med      cetirizine (ZyrTEC) 10 mg tablet Take 10 mg by mouth daily, Starting Mon 5/6/2024, Historical Med      esomeprazole (NexIUM) 40 MG capsule Take 40 mg by mouth daily before breakfast, Starting Fri 6/7/2024, Historical Med      fluticasone (FLONASE) 50 mcg/act nasal spray 2 sprays into each nostril daily, Starting Thu 9/28/2023, Until Fri 9/27/2024, Historical Med      ipratropium (ATROVENT) 0.06 % nasal spray 2 sprays into each nostril 3 (three) times a day, Starting Fri 2/23/2024, Normal      meclizine (ANTIVERT) 25 mg tablet Take 0.5 tablets (12.5 mg total) by mouth 3 (three) times a day as needed for dizziness, Starting Fri 8/19/2022, Normal      methocarbamol (ROBAXIN) 500 mg tablet Take 1 tablet (500 mg total) by mouth 2 (two) times a day, Starting Tue 12/29/2020, Normal      mometasone-formoterol (Dulera) 100-5 MCG/ACT inhaler Inhale 2 puffs 2 (two) times a day Rinse mouth after use., Starting Fri 9/27/2024, Normal      montelukast (SINGULAIR) 10 mg tablet TAKE 1 TABLET BY MOUTH EVERY DAY AT NIGHT, Historical Med      NON  FORMULARY Medical Marijuana, Historical Med      predniSONE 10 mg tablet Take 40 mg daily for 3 days. Then take 30 mg daily for 3 days. Then take 20 mg daily for 3 days. Then take 10 mg daily for 3 days. Then stop., Normal           No discharge procedures on file.  ED SEPSIS DOCUMENTATION   Time reflects when diagnosis was documented in both MDM as applicable and the Disposition within this note       Time User Action Codes Description Comment    10/9/2024  2:45 AM Alvaro Buchanan Add [K08.89] Pain, dental                  Alvaro Brian Buchanan MD  10/09/24 0431

## 2024-10-11 ENCOUNTER — HOSPITAL ENCOUNTER (OUTPATIENT)
Dept: PULMONOLOGY | Facility: HOSPITAL | Age: 34
End: 2024-10-11
Payer: COMMERCIAL

## 2024-10-11 DIAGNOSIS — J45.20 MILD INTERMITTENT ASTHMA WITHOUT COMPLICATION: ICD-10-CM

## 2024-10-11 PROCEDURE — 94060 EVALUATION OF WHEEZING: CPT

## 2024-10-11 PROCEDURE — 94726 PLETHYSMOGRAPHY LUNG VOLUMES: CPT | Performed by: INTERNAL MEDICINE

## 2024-10-11 PROCEDURE — 94760 N-INVAS EAR/PLS OXIMETRY 1: CPT

## 2024-10-11 PROCEDURE — 94726 PLETHYSMOGRAPHY LUNG VOLUMES: CPT

## 2024-10-11 PROCEDURE — 94070 EVALUATION OF WHEEZING: CPT | Performed by: INTERNAL MEDICINE

## 2024-10-11 RX ORDER — ALBUTEROL SULFATE 0.83 MG/ML
2.5 SOLUTION RESPIRATORY (INHALATION) ONCE AS NEEDED
Status: COMPLETED | OUTPATIENT
Start: 2024-10-11 | End: 2024-10-11

## 2024-10-11 RX ADMIN — METHACHOLINE CHLORIDE INHALATION SOLUTION 0.06 MG: KIT at 15:36

## 2024-10-11 RX ADMIN — ALBUTEROL SULFATE 2.5 MG: 2.5 SOLUTION RESPIRATORY (INHALATION) at 16:07

## 2024-10-18 ENCOUNTER — HOSPITAL ENCOUNTER (OUTPATIENT)
Dept: GASTROENTEROLOGY | Facility: HOSPITAL | Age: 34
Setting detail: OUTPATIENT SURGERY
End: 2024-10-18
Payer: COMMERCIAL

## 2024-10-18 ENCOUNTER — ANESTHESIA (OUTPATIENT)
Dept: GASTROENTEROLOGY | Facility: HOSPITAL | Age: 34
End: 2024-10-18
Payer: COMMERCIAL

## 2024-10-18 ENCOUNTER — ANESTHESIA EVENT (OUTPATIENT)
Dept: GASTROENTEROLOGY | Facility: HOSPITAL | Age: 34
End: 2024-10-18
Payer: COMMERCIAL

## 2024-10-18 VITALS
DIASTOLIC BLOOD PRESSURE: 87 MMHG | OXYGEN SATURATION: 100 % | HEART RATE: 74 BPM | BODY MASS INDEX: 31.84 KG/M2 | WEIGHT: 215 LBS | HEIGHT: 69 IN | SYSTOLIC BLOOD PRESSURE: 119 MMHG | RESPIRATION RATE: 18 BRPM | TEMPERATURE: 98.6 F

## 2024-10-18 DIAGNOSIS — K21.9 GASTROESOPHAGEAL REFLUX DISEASE WITHOUT ESOPHAGITIS: ICD-10-CM

## 2024-10-18 PROCEDURE — 43239 EGD BIOPSY SINGLE/MULTIPLE: CPT | Performed by: INTERNAL MEDICINE

## 2024-10-18 PROCEDURE — 88305 TISSUE EXAM BY PATHOLOGIST: CPT | Performed by: PATHOLOGY

## 2024-10-18 RX ORDER — CLINDAMYCIN HYDROCHLORIDE 300 MG/1
300 CAPSULE ORAL 4 TIMES DAILY
COMMUNITY

## 2024-10-18 RX ORDER — PROPOFOL 10 MG/ML
INJECTION, EMULSION INTRAVENOUS AS NEEDED
Status: DISCONTINUED | OUTPATIENT
Start: 2024-10-18 | End: 2024-10-18

## 2024-10-18 RX ORDER — SODIUM CHLORIDE, SODIUM LACTATE, POTASSIUM CHLORIDE, CALCIUM CHLORIDE 600; 310; 30; 20 MG/100ML; MG/100ML; MG/100ML; MG/100ML
INJECTION, SOLUTION INTRAVENOUS CONTINUOUS PRN
Status: DISCONTINUED | OUTPATIENT
Start: 2024-10-18 | End: 2024-10-18

## 2024-10-18 RX ORDER — ONDANSETRON 2 MG/ML
4 INJECTION INTRAMUSCULAR; INTRAVENOUS ONCE AS NEEDED
Status: CANCELLED | OUTPATIENT
Start: 2024-10-18

## 2024-10-18 RX ORDER — SODIUM CHLORIDE, SODIUM LACTATE, POTASSIUM CHLORIDE, CALCIUM CHLORIDE 600; 310; 30; 20 MG/100ML; MG/100ML; MG/100ML; MG/100ML
125 INJECTION, SOLUTION INTRAVENOUS CONTINUOUS
Status: DISCONTINUED | OUTPATIENT
Start: 2024-10-18 | End: 2024-10-22 | Stop reason: HOSPADM

## 2024-10-18 RX ADMIN — PROPOFOL 100 MG: 10 INJECTION, EMULSION INTRAVENOUS at 14:36

## 2024-10-18 RX ADMIN — PROPOFOL 100 MG: 10 INJECTION, EMULSION INTRAVENOUS at 14:37

## 2024-10-18 RX ADMIN — PROPOFOL 50 MG: 10 INJECTION, EMULSION INTRAVENOUS at 14:41

## 2024-10-18 RX ADMIN — SODIUM CHLORIDE, SODIUM LACTATE, POTASSIUM CHLORIDE, AND CALCIUM CHLORIDE: .6; .31; .03; .02 INJECTION, SOLUTION INTRAVENOUS at 14:22

## 2024-10-18 RX ADMIN — SODIUM CHLORIDE, SODIUM LACTATE, POTASSIUM CHLORIDE, AND CALCIUM CHLORIDE 125 ML/HR: .6; .31; .03; .02 INJECTION, SOLUTION INTRAVENOUS at 13:26

## 2024-10-18 RX ADMIN — PROPOFOL 50 MG: 10 INJECTION, EMULSION INTRAVENOUS at 14:38

## 2024-10-18 RX ADMIN — PROPOFOL 50 MG: 10 INJECTION, EMULSION INTRAVENOUS at 14:40

## 2024-10-18 NOTE — ANESTHESIA PREPROCEDURE EVALUATION
Procedure:  EGD  BRAVO PH MONITORING    Relevant Problems   PULMONARY   (+) Asthma   (+) Mild intermittent asthma without complication   (+) Shortness of breath      Ear/Nose/Throat   (+) Deviated nasal septum      Other   (+) Dyspepsia        Physical Exam    Airway    Mallampati score: II  TM Distance: >3 FB  Neck ROM: full     Dental       Cardiovascular      Pulmonary      Other Findings        Anesthesia Plan  ASA Score- 2     Anesthesia Type- IV sedation with anesthesia with ASA Monitors.         Additional Monitors:     Airway Plan:            Plan Factors-Exercise tolerance (METS): >4 METS.    Chart reviewed.    Patient summary reviewed.    Patient is a current smoker.  Patient did not smoke on day of surgery.            Induction- intravenous.    Postoperative Plan-     Perioperative Resuscitation Plan - Level 1 - Full Code.       Informed Consent- Anesthetic plan and risks discussed with patient.  I personally reviewed this patient with the CRNA. Discussed and agreed on the Anesthesia Plan with the CRNA..

## 2024-10-18 NOTE — ANESTHESIA POSTPROCEDURE EVALUATION
Post-Op Assessment Note    CV Status:  Stable  Pain Score: 0         Mental Status:  Sleepy   Hydration Status:  Stable   PONV Controlled:  None   Airway Patency:  Patent  There is a medical reason for not screening for obstructive sleep apnea and/or for not using two or more mitigation strategies   Post Op Vitals Reviewed: Yes    No anethesia notable event occurred.    Staff: CRNA           Last Filed PACU Vitals:  Vitals Value Taken Time   Temp     Pulse 76    /79    Resp 16    SpO2 100        Modified Marta:  No data recorded

## 2024-10-18 NOTE — ANESTHESIA POSTPROCEDURE EVALUATION
Post-Op Assessment Note    CV Status:  Stable    Pain management: adequate       Mental Status:  Alert and awake   Hydration Status:  Euvolemic   PONV Controlled:  Controlled   Airway Patency:  Patent     Post Op Vitals Reviewed: Yes    No anethesia notable event occurred.    Staff: Anesthesiologist           Last Filed PACU Vitals:  Vitals Value Taken Time   Temp     Pulse 74 10/18/24 1504   /87 10/18/24 1504   Resp 18 10/18/24 1504   SpO2 100 % 10/18/24 1504       Modified Marta:  Activity: 2 (10/18/2024  3:06 PM)  Respiration: 2 (10/18/2024  3:06 PM)  Circulation: 2 (10/18/2024  3:06 PM)  Consciousness: 2 (10/18/2024  3:06 PM)  Oxygen Saturation: 2 (10/18/2024  3:06 PM)  Modified Marta Score: 10 (10/18/2024  3:06 PM)

## 2024-10-28 PROCEDURE — 88305 TISSUE EXAM BY PATHOLOGIST: CPT | Performed by: PATHOLOGY

## 2025-01-13 ENCOUNTER — OFFICE VISIT (OUTPATIENT)
Age: 35
End: 2025-01-13
Payer: COMMERCIAL

## 2025-01-13 VITALS
BODY MASS INDEX: 31.84 KG/M2 | TEMPERATURE: 98 F | WEIGHT: 215 LBS | HEIGHT: 69 IN | HEART RATE: 79 BPM | OXYGEN SATURATION: 98 % | RESPIRATION RATE: 18 BRPM | SYSTOLIC BLOOD PRESSURE: 130 MMHG | DIASTOLIC BLOOD PRESSURE: 90 MMHG

## 2025-01-13 DIAGNOSIS — R07.89 CHEST PAIN, MUSCULOSKELETAL: Primary | ICD-10-CM

## 2025-01-13 DIAGNOSIS — K21.9 GASTROESOPHAGEAL REFLUX DISEASE WITHOUT ESOPHAGITIS: ICD-10-CM

## 2025-01-13 PROCEDURE — 99213 OFFICE O/P EST LOW 20 MIN: CPT | Performed by: STUDENT IN AN ORGANIZED HEALTH CARE EDUCATION/TRAINING PROGRAM

## 2025-01-13 RX ORDER — NAPROXEN 500 MG/1
500 TABLET ORAL 2 TIMES DAILY WITH MEALS
Qty: 14 TABLET | Refills: 0 | Status: SHIPPED | OUTPATIENT
Start: 2025-01-13

## 2025-01-13 NOTE — PROGRESS NOTES
Name: Dae Julio      : 1990      MRN: [id number]  Encounter Provider: Papa Shabazz DO  Encounter Date: 2025   Encounter department: [facility]  :  Assessment & Plan  Chest pain, musculoskeletal  Patient presenting with ongoing musculoskeletal chest discomfort near his xiphoid process along with shortness of breath for the past few months.  Initially thought to be GERD and was started on esomeprazole.  Patient underwent an EGD with a Bravo study in 2024 which was negative for any reflux. Discussed with the patient today that from a GI perspective no abnormalities were seen on his EGD and that his pain is most likely musculoskeletal in nature.  Start patient on a 1 week course of naproxen advised to follow-up with his PCP if symptoms fail to improve to discuss possible physical therapy.    -Naproxen 500 mg twice daily for 7 days  -Advised to continue as omeprazole while taking naproxen, can discontinue medication once naproxen course is completed as patient's Bravo study was negative for reflux and the medication has not lost any significant symptomatic relief.  -Follow-up with GI on an as-needed basis      Orders:    naproxen (Naprosyn) 500 mg tablet; Take 1 tablet (500 mg total) by mouth 2 (two) times a day with meals    Gastroesophageal reflux disease without esophagitis  See above           History of Present Illness   HPI    Dae Julio is a 34 y.o. male who presents for a follow-up visit.  Past medical history is notable for GERD.  Patient has been following with GI due to concerns of possible reflux in mid epigastric/chest pain which started a few months ago.  Patient also noted worsening shortness of breath and has seen pulmonology who did not find any significant findings with workup.  Patient states he continues to have the symptoms and has been taking Nexium however medication has not provided any relief.    EGD with Bravo Oct 2024  The  "esophagus, stomach and duodenum appeared normal.  The GE junction was visualized. A pH capsule was placed successfully in the esophagus (6 cm from the GE junction).  Performed forceps biopsies in the upper third of the esophagus, body of the stomach, antrum, duodenal bulb and 2nd part of the duodenum. Random biopsies taken from proximal esophagus for eosinophilic esophagitis  Random biopsies taken from the stomach for H pylori  Random biopsies taken from duodenum for celiac disease   Study negative for reflux      Review of Systems  Per HPI     Objective   /90 (Patient Position: Sitting, Cuff Size: Standard)   Pulse 79   Temp 98 °F (36.7 °C)   Resp 18   Ht 5' 9\" (1.753 m)   Wt 97.5 kg (215 lb)   SpO2 98%   BMI 31.75 kg/m²      Physical Exam  Pulmonary:      Effort: Pulmonary effort is normal.   Abdominal:      General: Abdomen is flat.      Palpations: Abdomen is soft.   Skin:     General: Skin is warm and dry.   Neurological:      Mental Status: He is alert.   Psychiatric:         Mood and Affect: Mood normal.           "

## 2025-03-11 ENCOUNTER — HOSPITAL ENCOUNTER (EMERGENCY)
Facility: HOSPITAL | Age: 35
Discharge: HOME | End: 2025-03-11
Attending: EMERGENCY MEDICINE
Payer: COMMERCIAL

## 2025-03-11 ENCOUNTER — APPOINTMENT (EMERGENCY)
Dept: RADIOLOGY | Facility: HOSPITAL | Age: 35
End: 2025-03-11
Payer: COMMERCIAL

## 2025-03-11 VITALS
DIASTOLIC BLOOD PRESSURE: 77 MMHG | OXYGEN SATURATION: 98 % | TEMPERATURE: 98 F | HEART RATE: 90 BPM | RESPIRATION RATE: 14 BRPM | SYSTOLIC BLOOD PRESSURE: 132 MMHG

## 2025-03-11 DIAGNOSIS — M79.602 LEFT ARM PAIN: ICD-10-CM

## 2025-03-11 DIAGNOSIS — M25.512 LEFT SHOULDER PAIN: Primary | ICD-10-CM

## 2025-03-11 PROCEDURE — 96372 THER/PROPH/DIAG INJ SC/IM: CPT

## 2025-03-11 PROCEDURE — 71046 X-RAY EXAM CHEST 2 VIEWS: CPT

## 2025-03-11 PROCEDURE — 93005 ELECTROCARDIOGRAM TRACING: CPT

## 2025-03-11 PROCEDURE — 99283 EMERGENCY DEPT VISIT LOW MDM: CPT

## 2025-03-11 PROCEDURE — 99284 EMERGENCY DEPT VISIT MOD MDM: CPT | Performed by: PHYSICIAN ASSISTANT

## 2025-03-11 RX ORDER — METHOCARBAMOL 500 MG/1
1000 TABLET, FILM COATED ORAL 2 TIMES DAILY PRN
Qty: 30 TABLET | Refills: 0 | Status: SHIPPED | OUTPATIENT
Start: 2025-03-11

## 2025-03-11 RX ORDER — KETOROLAC TROMETHAMINE 30 MG/ML
30 INJECTION, SOLUTION INTRAMUSCULAR; INTRAVENOUS ONCE
Status: COMPLETED | OUTPATIENT
Start: 2025-03-11 | End: 2025-03-11

## 2025-03-11 RX ORDER — METHOCARBAMOL 500 MG/1
1000 TABLET, FILM COATED ORAL ONCE
Status: COMPLETED | OUTPATIENT
Start: 2025-03-11 | End: 2025-03-11

## 2025-03-11 RX ORDER — MOMETASONE FUROATE AND FORMOTEROL FUMARATE DIHYDRATE 200; 5 UG/1; UG/1
2 AEROSOL RESPIRATORY (INHALATION) EVERY 12 HOURS
COMMUNITY
Start: 2025-01-13

## 2025-03-11 RX ORDER — LIDOCAINE 50 MG/G
1 PATCH TOPICAL ONCE
Status: DISCONTINUED | OUTPATIENT
Start: 2025-03-11 | End: 2025-03-11 | Stop reason: HOSPADM

## 2025-03-11 RX ORDER — METHYLPREDNISOLONE 4 MG/1
TABLET ORAL
Qty: 21 TABLET | Refills: 0 | Status: SHIPPED | OUTPATIENT
Start: 2025-03-11

## 2025-03-11 RX ORDER — ACETAMINOPHEN 325 MG/1
650 TABLET ORAL ONCE
Status: COMPLETED | OUTPATIENT
Start: 2025-03-11 | End: 2025-03-11

## 2025-03-11 RX ADMIN — KETOROLAC TROMETHAMINE 30 MG: 30 INJECTION, SOLUTION INTRAMUSCULAR at 16:56

## 2025-03-11 RX ADMIN — ACETAMINOPHEN 650 MG: 325 TABLET, FILM COATED ORAL at 16:56

## 2025-03-11 RX ADMIN — METHOCARBAMOL 1000 MG: 500 TABLET ORAL at 16:56

## 2025-03-11 RX ADMIN — LIDOCAINE 1 PATCH: 50 PATCH CUTANEOUS at 16:57

## 2025-03-11 NOTE — DISCHARGE INSTRUCTIONS
Alternate ice/heat, topical muscle rubs, etc.  Take steroid as directed with food.  Continue to alternate OTC tylenol and ibuprofen as needed.  Caution sedation with muscle relaxant.  No driving while taking.  Follow up with PCP in 3-5 days if not improving or return to ER as needed.

## 2025-03-11 NOTE — ED PROVIDER NOTES
Time reflects when diagnosis was documented in both MDM as applicable and the Disposition within this note       Time User Action Codes Description Comment    3/11/2025  6:32 PM [email or web address] [M25.512] Left shoulder pain     3/11/2025  6:32 PM Tammy Torres Add [M79.602] Left arm pain           ED Disposition       ED Disposition   Discharge    Condition   Stable    Date/Time   Tue Mar 11, 2025  6:32 PM    Comment   Dae Julio discharge to home/self care.                   Assessment & Plan       Medical Decision Making  33 yo male presenting for evaluation of left sided thoracic back and shoulder pain.  Atraumatic.  Will obtain EKG, CXR and provide symptomatic management and reassess.  PERC negative.  He is afebrile, well appearing.    Work up obtained as noted above.  EKG non ischemic.  CXR without acute cardiopulmonary process.  Symptomatically pt felt improved.  Given exam and reproducible nature of symptoms, this appears musculoskeletal in nature.  Non focal exam.    Advised to alternate ice/heat, topical muscle rubs.  Will provide steroid pack as well as muscle relaxant.  Risks/benefits/side effects discussed.  Sedation precautions reviewed.  Spouse is driving.    Reviewed symptomatic management.  OTC meds reviewed.  Anticipatory guidance.  Advised recheck with PCP or return to ER as needed.  Strict return precautions outlined.  Patient voiced understanding and had no further questions.    Please refer to above ER course for further details/discussion.      Problems Addressed:  Left arm pain: acute illness or injury  Left shoulder pain: acute illness or injury    Amount and/or Complexity of Data Reviewed  External Data Reviewed: notes.  Radiology: ordered and independent interpretation performed. Decision-making details documented in ED Course.  ECG/medicine tests: ordered and independent interpretation performed. Decision-making details documented in ED Course.    Risk  OTC drugs.  Prescription  drug management.        ED Course as of 03/11/25 2012   Tue Mar 11, 2025   1710 XR chest 2 views  Independently viewed and interpreted by me - no acute cardiopulmonary process; pending official read.   1831 Pt reassessed.  Pain feeling improved but still present.  Feels this is localized around the left trapezius.  He appears more comfortable.       Medications   lidocaine (LIDODERM) 5 % patch 1 patch (1 patch Topical Medication Applied 3/11/25 1657)   ketorolac (TORADOL) injection 30 mg (30 mg Intramuscular Given 3/11/25 1656)   methocarbamol (ROBAXIN) tablet 1,000 mg (1,000 mg Oral Given 3/11/25 1656)   acetaminophen (TYLENOL) tablet 650 mg (650 mg Oral Given 3/11/25 1656)       ED Risk Strat Scores                            SBIRT 22yo+      Flowsheet Row Most Recent Value   Initial Alcohol Screen: US AUDIT-C     1. How often do you have a drink containing alcohol? 0 Filed at: 03/11/2025 1602   2. How many drinks containing alcohol do you have on a typical day you are drinking?  0 Filed at: 03/11/2025 1602   3a. Male UNDER 65: How often do you have five or more drinks on one occasion? 0 Filed at: 03/11/2025 1602   3b. FEMALE Any Age, or MALE 65+: How often do you have 4 or more drinks on one occassion? 0 Filed at: 03/11/2025 1602   Audit-C Score 0 Filed at: 03/11/2025 1602   FRANK: How many times in the past year have you...    Used an illegal drug or used a prescription medication for non-medical reasons? Never Filed at: 03/11/2025 1602                            History of Present Illness       Chief Complaint   Patient presents with    Arm Pain     Pt states that yesterday morning when he woke up he started with left sided neck pain that radiates down his left arm        Past Medical History:   Diagnosis Date    Anxiety     Asthma     Costochondral chest pain     GERD (gastroesophageal reflux disease)     History of tonsillectomy and adenoidectomy     Shortness of breath     Sleep apnea     pt states this has  resolved as an adult      Past Surgical History:   Procedure Laterality Date    FINGER SURGERY      [place] REPAIR NASAL VESTIBULAR STENOSIS N/A 8/22/2024    Procedure: REPAIR VESTIBULAR STENOSIS;  Surgeon: Jose Chauhan MD;  Location: [facility];  Service: ENT    [place] SEPTOPLASTY/SUBMUCOUS RESECJ W/WO CARTILAGE GRF N/A 8/22/2024    Procedure: SEPTOPLASTY;  Surgeon: Jose Chauhan MD;  Location: [facility];  Service: ENT    SEPTOPLASTY      TONSILLECTOMY      TYMPANOSTOMY TUBE PLACEMENT        History reviewed. No pertinent family history.   Social History     Tobacco Use    Smoking status: Some Days     Types: Cigarettes    Smokeless tobacco: Never    Tobacco comments:     Smokes Medical Marijuana-- Patient does not smoke tobacco.  Last smoked 1 month ago    Vaping Use    Vaping status: Never Used   Substance Use Topics    Alcohol use: Yes     Comment: socially    Drug use: Yes     Types: Marijuana     Comment: medical maijuana used edible last 2100 [id number]      E-Cigarette/Vaping    E-Cigarette Use Never User       E-Cigarette/Vaping Substances    Nicotine No     THC No     CBD No     Flavoring No     Other No     Unknown No       I have reviewed and agree with the history as documented.     34 year old male with PMH anxiety, asthma, GERD presenting for evaluation of left sided neck and arm pain.  Pt reports this started yesterday after he woke up.  Reports pain in left side of neck which radiates down his left arm.  Denies numbness, tingling, weakness.  He reports pain around left shoulder blade primarily.  He describes diffuse spasms of pain.  Denies fever, chills, cough, congestion.  He notes he was recently sick with a cold but this is all better.  Denies chest pain, SOB, wheezing.  Denies N/V/D, abdominal pain.  No reported alleviating factors.  He reports pain worse with certain movements, positions.  He notes h/o prior problems with his back.  No reported falls, injury or trauma.  He notes family told him to  "get his \"heart checked\".      History provided by:  Patient and medical records   used: No    Arm Pain  Chronicity:  New  Associated symptoms: no abdominal pain, no chest pain, no congestion, no cough, no diarrhea, no fatigue, no fever, no headaches, no nausea, no rash, no rhinorrhea, no shortness of breath, no sore throat, no vomiting and no wheezing        Review of Systems   Constitutional: Negative.  Negative for chills, fatigue and fever.   HENT: Negative.  Negative for congestion, rhinorrhea and sore throat.    Eyes: Negative.  Negative for visual disturbance.   Respiratory: Negative.  Negative for cough, shortness of breath and wheezing.    Cardiovascular: Negative.  Negative for chest pain, palpitations and leg swelling.   Gastrointestinal: Negative.  Negative for abdominal pain, diarrhea, nausea and vomiting.   Genitourinary: Negative.    Musculoskeletal:  Positive for arthralgias and neck pain. Negative for back pain.   Skin: Negative.  Negative for rash.   Neurological: Negative.  Negative for dizziness, weakness, light-headedness, numbness and headaches.   Psychiatric/Behavioral: Negative.     All other systems reviewed and are negative.          Objective       ED Triage Vitals   Temperature Pulse Blood Pressure Respirations SpO2 Patient Position - Orthostatic VS   03/11/25 1604 03/11/25 1604 03/11/25 1605 03/11/25 1604 03/11/25 1604 03/11/25 1605   98 °F (36.7 °C) 90 132/77 14 98 % Sitting      Temp Source Heart Rate Source BP Location FiO2 (%) Pain Score    03/11/25 1604 03/11/25 1604 03/11/25 1605 -- 03/11/25 1604    Temporal Monitor Right arm  10 - Worst Possible Pain      Vitals      Date and Time Temp Pulse SpO2 Resp BP Pain Score FACES Pain Rating User   03/11/25 1726 -- -- -- -- -- No Pain -- CLS   03/11/25 1656 -- -- -- -- -- No Pain -- CLS   03/11/25 1605 -- -- -- -- 132/77 -- -- AB   03/11/25 1604 98 °F (36.7 °C) 90 98 % 14 -- 10 - Worst Possible Pain -- AB      "       Physical Exam  Vitals and nursing note reviewed.   Constitutional:       General: He is awake. He is not in acute distress.     Appearance: Normal appearance. He is well-developed. He is not toxic-appearing or diaphoretic.   HENT:      Head: Normocephalic and atraumatic.      Right Ear: Hearing and external ear normal.      Left Ear: Hearing and external ear normal.      Mouth/Throat:      Mouth: Mucous membranes are moist.      Pharynx: Oropharynx is clear. Uvula midline.   Eyes:      General: Lids are normal. No scleral icterus.     Conjunctiva/sclera: Conjunctivae normal.   Neck:      Trachea: Trachea and phonation normal.   Cardiovascular:      Rate and Rhythm: Normal rate and regular rhythm.      Pulses: Normal pulses.           Radial pulses are 2+ on the right side and 2+ on the left side.      Heart sounds: Normal heart sounds, S1 normal and S2 normal. No murmur heard.  Pulmonary:      Effort: Pulmonary effort is normal. No tachypnea or respiratory distress.      Breath sounds: Normal breath sounds. No wheezing, rhonchi or rales.   Musculoskeletal:         General: Normal range of motion.      Left shoulder: No bony tenderness. Normal range of motion. Normal pulse.      Cervical back: Normal range of motion and neck supple. Tenderness present. No swelling or signs of trauma. No spinous process tenderness. Normal range of motion.      Thoracic back: No bony tenderness.      Lumbar back: No bony tenderness.        Back:       Comments: No overlying skin changes.  No signs of trauma.   Skin:     General: Skin is warm and dry.      Capillary Refill: Capillary refill takes less than 2 seconds.      Findings: No bruising or rash.   Neurological:      General: No focal deficit present.      Mental Status: He is alert and oriented to person, place, and time.      GCS: GCS eye subscore is 4. GCS verbal subscore is 5. GCS motor subscore is 6.      Sensory: Sensation is intact.      Motor: Motor function is  intact.      Gait: Gait normal.      Comments: Ambulatory to exam room, steady gait.   Psychiatric:         Mood and Affect: Mood normal.         Speech: Speech normal.         Behavior: Behavior normal. Behavior is cooperative.         Results Reviewed       None            XR chest 2 views    (Results Pending)       ECG 12 Lead Documentation Only    Date/Time: 3/11/2025 5:20 PM    Performed by: Tammy Torres PA-C  Authorized by: Tammy Torres PA-C    Indications / Diagnosis:  Left shoulder pain  ECG reviewed by me, the ED Provider: yes    Patient location:  ED  Previous ECG:     Comparison to cardiac monitor: Yes    Interpretation:     Interpretation: normal    Rate:     ECG rate:  64    ECG rate assessment: normal    Rhythm:     Rhythm: sinus rhythm    Ectopy:     Ectopy: none    QRS:     QRS axis:  Normal    QRS intervals:  Normal  Conduction:     Conduction: normal    ST segments:     ST segments:  Normal  T waves:     T waves: normal    Comments:      , QRS 92, QT//392; no acute ischemic changes.      ED Medication and Procedure Management   Prior to Admission Medications   Prescriptions Last Dose Informant Patient Reported? Taking?   Dulera 200-5 MCG/ACT inhaler   Yes Yes   Sig: Inhale 2 puffs every 12 (twelve) hours   NON FORMULARY Not Taking Self Yes No   Sig: Medical Marijuana   Patient not taking: Reported on 3/11/2025   acetaminophen (TYLENOL) 500 mg tablet Not Taking  No No   Sig: Take 1 tablet (500 mg total) by mouth every 6 (six) hours as needed for mild pain   Patient not taking: Reported on 3/11/2025   albuterol (PROVENTIL HFA,VENTOLIN HFA) 90 mcg/act inhaler More than a month Self Yes No   Sig: Inhale 2 puffs every 6 (six) hours as needed for wheezing   cetirizine (ZyrTEC) 10 mg tablet Not Taking  Yes No   Sig: Take 10 mg by mouth daily   Patient not taking: Reported on 3/11/2025   chlorhexidine (PERIDEX) 0.12 % solution   No No   Sig: Apply 15 mL to the mouth or throat 2  (two) times a day   clindamycin (CLEOCIN) 300 MG capsule   Yes No   Sig: Take 300 mg by mouth 4 (four) times a day   esomeprazole (NexIUM) 40 MG capsule Not Taking  Yes No   Sig: Take 40 mg by mouth daily before breakfast   Patient not taking: Reported on 3/11/2025   fluticasone (FLONASE) 50 mcg/act nasal spray  Self Yes No   Si sprays into each nostril daily   ibuprofen (MOTRIN) 400 mg tablet Not Taking  No No   Sig: Take 1 tablet (400 mg total) by mouth every 6 (six) hours as needed for mild pain   Patient not taking: Reported on 3/11/2025   ipratropium (ATROVENT) 0.06 % nasal spray Not Taking  No No   Si sprays into each nostril 3 (three) times a day   Patient not taking: Reported on 3/11/2025   meclizine (ANTIVERT) 25 mg tablet Not Taking Self No No   Sig: Take 0.5 tablets (12.5 mg total) by mouth 3 (three) times a day as needed for dizziness   Patient not taking: Reported on 3/11/2025   methocarbamol (ROBAXIN) 500 mg tablet Not Taking Self No No   Sig: Take 1 tablet (500 mg total) by mouth 2 (two) times a day   Patient not taking: Reported on 2021   montelukast (SINGULAIR) 10 mg tablet Not Taking Self Yes No   Sig: TAKE 1 TABLET BY MOUTH EVERY DAY AT NIGHT   Patient not taking: Reported on 3/11/2025   naproxen (Naprosyn) 500 mg tablet Not Taking  No No   Sig: Take 1 tablet (500 mg total) by mouth 2 (two) times a day with meals   Patient not taking: Reported on 3/11/2025      Facility-Administered Medications: None     Discharge Medication List as of 3/11/2025  6:36 PM        START taking these medications    Details   !! methocarbamol (ROBAXIN) 500 mg tablet Take 2 tablets (1,000 mg total) by mouth 2 (two) times a day as needed for muscle spasms, Starting Tue 3/11/2025, Normal      methylPREDNISolone 4 MG tablet therapy pack Use as directed on package, Normal       !! - Potential duplicate medications found. Please discuss with provider.        CONTINUE these medications which have NOT CHANGED     Details   Dulera 200-5 MCG/ACT inhaler Inhale 2 puffs every 12 (twelve) hours, Starting Mon 1/13/2025, Historical Med      acetaminophen (TYLENOL) 500 mg tablet Take 1 tablet (500 mg total) by mouth every 6 (six) hours as needed for mild pain, Starting Wed 10/9/2024, Normal      albuterol (PROVENTIL HFA,VENTOLIN HFA) 90 mcg/act inhaler Inhale 2 puffs every 6 (six) hours as needed for wheezing, Historical Med      cetirizine (ZyrTEC) 10 mg tablet Take 10 mg by mouth daily, Starting Mon 5/6/2024, Historical Med      chlorhexidine (PERIDEX) 0.12 % solution Apply 15 mL to the mouth or throat 2 (two) times a day, Starting Wed 10/9/2024, Normal      clindamycin (CLEOCIN) 300 MG capsule Take 300 mg by mouth 4 (four) times a day, Historical Med      esomeprazole (NexIUM) 40 MG capsule Take 40 mg by mouth daily before breakfast, Starting Fri 6/7/2024, Historical Med      fluticasone (FLONASE) 50 mcg/act nasal spray 2 sprays into each nostril daily, Starting Thu 9/28/2023, Until Fri 9/27/2024, Historical Med      ibuprofen (MOTRIN) 400 mg tablet Take 1 tablet (400 mg total) by mouth every 6 (six) hours as needed for mild pain, Starting Wed 10/9/2024, Normal      ipratropium (ATROVENT) 0.06 % nasal spray 2 sprays into each nostril 3 (three) times a day, Starting Fri 2/23/2024, Normal      meclizine (ANTIVERT) 25 mg tablet Take 0.5 tablets (12.5 mg total) by mouth 3 (three) times a day as needed for dizziness, Starting Fri 8/19/2022, Normal      !! methocarbamol (ROBAXIN) 500 mg tablet Take 1 tablet (500 mg total) by mouth 2 (two) times a day, Starting Tue 12/29/2020, Normal      montelukast (SINGULAIR) 10 mg tablet TAKE 1 TABLET BY MOUTH EVERY DAY AT NIGHT, Historical Med      naproxen (Naprosyn) 500 mg tablet Take 1 tablet (500 mg total) by mouth 2 (two) times a day with meals, Starting Mon 1/13/2025, Normal      NON FORMULARY Medical Marijuana, Historical Med       !! - Potential duplicate medications found. Please discuss  with provider.        No discharge procedures on file.  ED SEPSIS DOCUMENTATION   Time reflects when diagnosis was documented in both MDM as applicable and the Disposition within this note       Time User Action Codes Description Comment    3/11/2025  6:32 PM Tammy Torres [M25.512] Left shoulder pain     3/11/2025  6:32 PM Tammy Torres [M79.602] Left arm pain                  Tammy Torres PA-C  03/11/25 2012

## 2025-03-12 LAB
ATRIAL RATE: 64 BPM
P AXIS: 75 DEGREES
PR INTERVAL: 140 MS
QRS AXIS: 71 DEGREES
QRSD INTERVAL: 92 MS
QT INTERVAL: 380 MS
QTC INTERVAL: 392 MS
T WAVE AXIS: 70 DEGREES
VENTRICULAR RATE: 64 BPM

## 2025-03-12 PROCEDURE — 93010 ELECTROCARDIOGRAM REPORT: CPT | Performed by: INTERNAL MEDICINE

## 2025-03-14 ENCOUNTER — HOSPITAL ENCOUNTER (OUTPATIENT)
Dept: SLEEP CENTER | Facility: HOSPITAL | Age: 35
Discharge: HOME | End: 2025-03-14
Payer: COMMERCIAL

## 2025-03-14 DIAGNOSIS — R06.83 SNORING: ICD-10-CM

## 2025-03-14 DIAGNOSIS — G47.19 EXCESSIVE DAYTIME SLEEPINESS: ICD-10-CM

## 2025-03-14 PROCEDURE — 95810 POLYSOM 6/> YRS 4/> PARAM: CPT

## 2025-03-15 PROBLEM — G47.33 OSA (OBSTRUCTIVE SLEEP APNEA): Status: ACTIVE | Noted: 2025-03-15

## 2025-03-15 NOTE — PROGRESS NOTES
Sleep Study Documentation    Pre-Sleep Study       Sleep testing procedure explained to patient:YES    Patient napped prior to study:NO    Caffeine:Dayshift worker after 12PM.  Caffeine use:NO    Alcohol:Dayshift workers after 5PM: Alcohol use:NO    Typical day for patient:YES       Study Documentation    Sleep Study Indications: The patient is here for snoring and excessive daytime sleepiness.     Sleep Study: Diagnostic   Snore:Moderate  Supplemental O2: no    O2 flow rate (L/min) range N/A  O2 flow rate (L/min) final N/A  Minimum SaO2 92  Baseline SaO2 95    Mode of Therapy:N/A    EKG abnormalities: no     EEG abnormalities: no    Were abnormal behaviors in sleep observed:NO    Is Total Sleep Study Recording Time < 2 hours: N/A    Is Total Sleep Study Recording Time > 2 hours but study is incomplete: N/A    Is Total Sleep Study Recording Time 6 hours or more but sleep was not obtained: NO    Patient classification: employed       Post-Sleep Study    Medication used at bedtime or during sleep study:YES other prescription medications    Patient reports time it took to fall asleep:20 to 30 minutes    Patient reports waking up during study:1 to 2 times.  Patient reports returning to sleep without difficulty.    Patient reports sleeping 4 to 6 hours without dreaming.    Does the Patient feel this is a typical night of sleep:typical    Patient rated sleepiness: Somewhat sleepy or tired    PAP treatment:no.

## 2025-03-20 ENCOUNTER — TELEPHONE (OUTPATIENT)
Age: 35
End: 2025-03-20

## 2025-03-20 ENCOUNTER — TELEPHONE (OUTPATIENT)
Dept: GASTROENTEROLOGY | Facility: CLINIC | Age: 35
End: 2025-03-20

## 2025-03-20 ENCOUNTER — HOSPITAL ENCOUNTER (OUTPATIENT)
Dept: RADIOLOGY | Facility: HOSPITAL | Age: 35
Discharge: HOME | End: 2025-03-20
Payer: COMMERCIAL

## 2025-03-20 DIAGNOSIS — M54.10 RADICULOPATHY, UNSPECIFIED SPINAL REGION: ICD-10-CM

## 2025-03-20 PROCEDURE — 72050 X-RAY EXAM NECK SPINE 4/5VWS: CPT

## 2025-03-20 NOTE — TELEPHONE ENCOUNTER
Patient requesting 10/18/2025 Bravo result be shared with his PCP Dr. Hartman (PCP) - stating he just felt his office and they can't see any results. / Explained a message would be sent

## 2025-03-30 ENCOUNTER — HOSPITAL ENCOUNTER (EMERGENCY)
Facility: HOSPITAL | Age: 35
Discharge: HOME | End: 2025-03-30
Attending: EMERGENCY MEDICINE
Payer: COMMERCIAL

## 2025-03-30 ENCOUNTER — APPOINTMENT (EMERGENCY)
Dept: CT IMAGING | Facility: HOSPITAL | Age: 35
End: 2025-03-30
Payer: COMMERCIAL

## 2025-03-30 ENCOUNTER — APPOINTMENT (EMERGENCY)
Dept: RADIOLOGY | Facility: HOSPITAL | Age: 35
End: 2025-03-30
Payer: COMMERCIAL

## 2025-03-30 VITALS
SYSTOLIC BLOOD PRESSURE: 117 MMHG | DIASTOLIC BLOOD PRESSURE: 66 MMHG | RESPIRATION RATE: 16 BRPM | WEIGHT: 205.25 LBS | OXYGEN SATURATION: 95 % | BODY MASS INDEX: 30.31 KG/M2 | TEMPERATURE: 97.6 F | HEART RATE: 59 BPM

## 2025-03-30 DIAGNOSIS — M47.812 CERVICAL SPINE DEGENERATION: Primary | ICD-10-CM

## 2025-03-30 LAB
ALBUMIN SERPL BCG-MCNC: 4.5 G/DL (ref 3.5–5)
ALP SERPL-CCNC: 57 U/L (ref 34–104)
ALT SERPL W P-5'-P-CCNC: 20 U/L (ref 7–52)
ANION GAP SERPL CALCULATED.3IONS-SCNC: 7 MMOL/L (ref 4–13)
ANION GAP SERPL CALCULATED.3IONS-SCNC: 8 MMOL/L (ref 4–13)
AST SERPL W P-5'-P-CCNC: 29 U/L (ref 13–39)
BASOPHILS # BLD AUTO: 0.07 THOUSANDS/ÂΜL (ref 0–0.1)
BASOPHILS NFR BLD AUTO: 1 % (ref 0–1)
BILIRUB SERPL-MCNC: 0.4 MG/DL (ref 0.2–1)
BUN SERPL-MCNC: 17 MG/DL (ref 5–25)
BUN SERPL-MCNC: 18 MG/DL (ref 5–25)
CALCIUM SERPL-MCNC: 9.1 MG/DL (ref 8.4–10.2)
CALCIUM SERPL-MCNC: 9.2 MG/DL (ref 8.4–10.2)
CHLORIDE SERPL-SCNC: 103 MMOL/L (ref 96–108)
CHLORIDE SERPL-SCNC: 105 MMOL/L (ref 96–108)
CK SERPL-CCNC: 287 U/L (ref 39–308)
CO2 SERPL-SCNC: 25 MMOL/L (ref 21–32)
CO2 SERPL-SCNC: 26 MMOL/L (ref 21–32)
CREAT SERPL-MCNC: 0.84 MG/DL (ref 0.6–1.3)
CREAT SERPL-MCNC: 0.89 MG/DL (ref 0.6–1.3)
EOSINOPHIL # BLD AUTO: 0.45 THOUSAND/ÂΜL (ref 0–0.61)
EOSINOPHIL NFR BLD AUTO: 4 % (ref 0–6)
ERYTHROCYTE [DISTWIDTH] IN BLOOD BY AUTOMATED COUNT: 13.7 % (ref 11.6–15.1)
GFR SERPL CREATININE-BSD FRML MDRD: 113 ML/MIN/1.73SQ M
GFR SERPL CREATININE-BSD FRML MDRD: 115 ML/MIN/1.73SQ M
GLUCOSE SERPL-MCNC: 90 MG/DL (ref 65–140)
GLUCOSE SERPL-MCNC: 97 MG/DL (ref 65–140)
HCT VFR BLD AUTO: 39.1 % (ref 36.5–49.3)
HGB BLD-MCNC: 13.7 G/DL (ref 12–17)
IMM GRANULOCYTES # BLD AUTO: 0.04 THOUSAND/UL (ref 0–0.2)
IMM GRANULOCYTES NFR BLD AUTO: 0 % (ref 0–2)
LACTATE SERPL-SCNC: 1.4 MMOL/L (ref 0.5–2)
LYMPHOCYTES # BLD AUTO: 4.19 THOUSANDS/ÂΜL (ref 0.6–4.47)
LYMPHOCYTES NFR BLD AUTO: 39 % (ref 14–44)
MCH RBC QN AUTO: 30.2 PG (ref 26.8–34.3)
MCHC RBC AUTO-ENTMCNC: 35 G/DL (ref 31.4–37.4)
MCV RBC AUTO: 86 FL (ref 82–98)
MONOCYTES # BLD AUTO: 0.61 THOUSAND/ÂΜL (ref 0.17–1.22)
MONOCYTES NFR BLD AUTO: 6 % (ref 4–12)
NEUTROPHILS # BLD AUTO: 5.34 THOUSANDS/ÂΜL (ref 1.85–7.62)
NEUTS SEG NFR BLD AUTO: 50 % (ref 43–75)
NRBC BLD AUTO-RTO: 0 /100 WBCS
PLATELET # BLD AUTO: 343 THOUSANDS/UL (ref 149–390)
PMV BLD AUTO: 9.4 FL (ref 8.9–12.7)
POTASSIUM SERPL-SCNC: 4.3 MMOL/L (ref 3.5–5.3)
POTASSIUM SERPL-SCNC: 5.5 MMOL/L (ref 3.5–5.3)
PROT SERPL-MCNC: 7.2 G/DL (ref 6.4–8.4)
RBC # BLD AUTO: 4.54 MILLION/UL (ref 3.88–5.62)
SODIUM SERPL-SCNC: 135 MMOL/L (ref 135–147)
SODIUM SERPL-SCNC: 139 MMOL/L (ref 135–147)
WBC # BLD AUTO: 10.7 THOUSAND/UL (ref 4.31–10.16)

## 2025-03-30 PROCEDURE — 70491 CT SOFT TISSUE NECK W/DYE: CPT

## 2025-03-30 PROCEDURE — 72125 CT NECK SPINE W/O DYE: CPT

## 2025-03-30 PROCEDURE — 96361 HYDRATE IV INFUSION ADD-ON: CPT

## 2025-03-30 PROCEDURE — 99284 EMERGENCY DEPT VISIT MOD MDM: CPT

## 2025-03-30 PROCEDURE — 73030 X-RAY EXAM OF SHOULDER: CPT

## 2025-03-30 PROCEDURE — 96375 TX/PRO/DX INJ NEW DRUG ADDON: CPT

## 2025-03-30 PROCEDURE — 82550 ASSAY OF CK (CPK): CPT | Performed by: PHYSICIAN ASSISTANT

## 2025-03-30 PROCEDURE — 80053 COMPREHEN METABOLIC PANEL: CPT | Performed by: PHYSICIAN ASSISTANT

## 2025-03-30 PROCEDURE — 83605 ASSAY OF LACTIC ACID: CPT | Performed by: PHYSICIAN ASSISTANT

## 2025-03-30 PROCEDURE — 36415 COLL VENOUS BLD VENIPUNCTURE: CPT | Performed by: PHYSICIAN ASSISTANT

## 2025-03-30 PROCEDURE — 99285 EMERGENCY DEPT VISIT HI MDM: CPT | Performed by: EMERGENCY MEDICINE

## 2025-03-30 PROCEDURE — 85025 COMPLETE CBC W/AUTO DIFF WBC: CPT | Performed by: PHYSICIAN ASSISTANT

## 2025-03-30 PROCEDURE — 96374 THER/PROPH/DIAG INJ IV PUSH: CPT

## 2025-03-30 PROCEDURE — 80048 BASIC METABOLIC PNL TOTAL CA: CPT | Performed by: PHYSICIAN ASSISTANT

## 2025-03-30 RX ORDER — KETOROLAC TROMETHAMINE 30 MG/ML
15 INJECTION, SOLUTION INTRAMUSCULAR; INTRAVENOUS ONCE
Status: COMPLETED | OUTPATIENT
Start: 2025-03-30 | End: 2025-03-30

## 2025-03-30 RX ORDER — METHYLPREDNISOLONE SODIUM SUCCINATE 125 MG/2ML
125 INJECTION, POWDER, LYOPHILIZED, FOR SOLUTION INTRAMUSCULAR; INTRAVENOUS ONCE
Status: COMPLETED | OUTPATIENT
Start: 2025-03-30 | End: 2025-03-30

## 2025-03-30 RX ORDER — PREDNISONE 50 MG/1
50 TABLET ORAL DAILY
Qty: 5 TABLET | Refills: 0 | Status: SHIPPED | OUTPATIENT
Start: 2025-03-30

## 2025-03-30 RX ORDER — HYDROMORPHONE HCL/PF 1 MG/ML
1 SYRINGE (ML) INJECTION ONCE
Refills: 0 | Status: COMPLETED | OUTPATIENT
Start: 2025-03-30 | End: 2025-03-30

## 2025-03-30 RX ORDER — CYCLOBENZAPRINE HCL 10 MG
10 TABLET ORAL 2 TIMES DAILY PRN
Qty: 20 TABLET | Refills: 0 | Status: SHIPPED | OUTPATIENT
Start: 2025-03-30

## 2025-03-30 RX ORDER — CYCLOBENZAPRINE HCL 10 MG
10 TABLET ORAL ONCE
Status: COMPLETED | OUTPATIENT
Start: 2025-03-30 | End: 2025-03-30

## 2025-03-30 RX ADMIN — CYCLOBENZAPRINE 10 MG: 10 TABLET, FILM COATED ORAL at 11:26

## 2025-03-30 RX ADMIN — HYDROMORPHONE HYDROCHLORIDE 1 MG: 1 INJECTION, SOLUTION INTRAMUSCULAR; INTRAVENOUS; SUBCUTANEOUS at 12:36

## 2025-03-30 RX ADMIN — SODIUM CHLORIDE 1000 ML: 0.9 INJECTION, SOLUTION INTRAVENOUS at 10:54

## 2025-03-30 RX ADMIN — KETOROLAC TROMETHAMINE 15 MG: 30 INJECTION, SOLUTION INTRAMUSCULAR at 10:00

## 2025-03-30 RX ADMIN — IOHEXOL 85 ML: 350 INJECTION, SOLUTION INTRAVENOUS at 13:03

## 2025-03-30 RX ADMIN — METHYLPREDNISOLONE SODIUM SUCCINATE 125 MG: 125 INJECTION, POWDER, FOR SOLUTION INTRAMUSCULAR; INTRAVENOUS at 10:01

## 2025-03-30 NOTE — Clinical Note
Dae Julio was seen and treated in our emergency department on 3/30/2025.    No restrictions            Diagnosis: Neck Pain    Dae  .    He may return on this date: 03/31/2025         If you have any questions or concerns, please don't hesitate to call.      Yovani Soares PA-C    ______________________________           _______________          _______________  Hospital Representative                              Date                                Time

## 2025-03-30 NOTE — DISCHARGE INSTRUCTIONS
Discontinue the Robaxin which is the methocarbamol.  Take the Flexeril as well as the prednisone for pain I did place referral for you to see the comprehensive spine program.

## 2025-03-30 NOTE — ED PROVIDER NOTES
Time reflects when diagnosis was documented in both MDM as applicable and the Disposition within this note       Time User Action Codes Description Comment    3/30/2025 11:14 AM Yovani Soares Add [M47.812] Cervical spine degeneration           ED Disposition       ED Disposition   Discharge    Condition   Stable    Date/Time   Sun Mar 30, 2025  3:18 PM    Comment   Dae Akashfrancesco discharge to home/self care.                   Assessment & Plan       Medical Decision Making  34-year-old male here for repeat evaluation of left-sided neck pain left-sided arm pain.  See HPI for further details has had cardiac workup which was normal.  Denies chest pain.  Has had imaging of the chest as well as cervical spine in the form of a plain film image.  See HPI for further details.  Differential diagnosis in this patient includes trapezius strain, cervical fracture or traumatic malalignment, cervical radiculopathy, soft tissue abscess rhabdo myelosis.    Lengthy workup is without concern for any identifiable infection I am doubting any sort of soft tissue abscess or necrotizing fasciitis he has noted improvement with the fluids and pain medicine.  Him having placed referral for him he will follow-up as soon as possible.    Amount and/or Complexity of Data Reviewed  Labs: ordered. Decision-making details documented in ED Course.  Radiology: ordered.    Risk  Prescription drug management.        ED Course as of 03/30/25 1520   Sun Mar 30, 2025   0934 SpO2: 98 %   0934 Respirations: 16   0934 Pulse: 84   0934 Temperature: 97.6 °F (36.4 °C)   0934 Blood Pressure: 132/89   0934 Vs reviewed wnl   1023 WBC(!): 10.70   1023 Hemoglobin: 13.7   1023 Platelet Count: 343   1113 IMPRESSION:     No acute fracture or traumatic malalignment.     Multilevel mild cervical degenerative changes with mild canal stenosis.     1218 Potassium: 4.3   1218 LACTIC ACID: 1.4   1228 Patient was reevaluated again at bedside at 1220 hrs. he states he  still experiencing 10 out of 10 pain despite Toradol Decadron and Flexeril.  He has a minimal white count elevation however CK is top normal.  Pain appears to be out of proportion therefore I have concern for deeper tissue abnormality versus tissue necrosis will complete CT scan with contrast of the region spoke with CT technician regarding the region of concern       Medications   methylPREDNISolone sodium succinate (Solu-MEDROL) injection 125 mg (125 mg Intravenous Given 3/30/25 1001)   ketorolac (TORADOL) injection 15 mg (15 mg Intravenous Given 3/30/25 1000)   sodium chloride 0.9 % bolus 1,000 mL (0 mL Intravenous Stopped 3/30/25 1216)   cyclobenzaprine (FLEXERIL) tablet 10 mg (10 mg Oral Given 3/30/25 1126)   HYDROmorphone (DILAUDID) injection 1 mg (1 mg Intravenous Given 3/30/25 1236)   iohexol (OMNIPAQUE) 350 MG/ML injection (MULTI-DOSE) 85 mL (85 mL Intravenous Given 3/30/25 1303)       ED Risk Strat Scores                            SBIRT 22yo+      Flowsheet Row Most Recent Value   Initial Alcohol Screen: US AUDIT-C     1. How often do you have a drink containing alcohol? 0 Filed at: 03/30/2025 0931   2. How many drinks containing alcohol do you have on a typical day you are drinking?  0 Filed at: 03/30/2025 0931   3a. Male UNDER 65: How often do you have five or more drinks on one occasion? 0 Filed at: 03/30/2025 0931   3b. FEMALE Any Age, or MALE 65+: How often do you have 4 or more drinks on one occassion? 0 Filed at: 03/30/2025 0931   Audit-C Score 0 Filed at: 03/30/2025 0931   FRANK: How many times in the past year have you...    Used an illegal drug or used a prescription medication for non-medical reasons? Never Filed at: 03/30/2025 0931                            History of Present Illness       Chief Complaint   Patient presents with    Neck Pain     Patient with left sided neck pain that radiates down left arm, was seen last week or same issue but states it has gotten worse. Denies injury, states  he woke up with the pain        Past Medical History:   Diagnosis Date    Anxiety     Asthma     Costochondral chest pain     GERD (gastroesophageal reflux disease)     History of tonsillectomy and adenoidectomy     Shortness of breath     Sleep apnea     pt states this has resolved as an adult      Past Surgical History:   Procedure Laterality Date    FINGER SURGERY      MS REPAIR NASAL VESTIBULAR STENOSIS N/A 8/22/2024    Procedure: REPAIR VESTIBULAR STENOSIS;  Surgeon: Jose Chauhan MD;  Location: [facility];  Service: ENT    MS SEPTOPLASTY/SUBMUCOUS RESECJ W/WO CARTILAGE GRF N/A 8/22/2024    Procedure: SEPTOPLASTY;  Surgeon: Jose Chauhan MD;  Location: [facility];  Service: ENT    SEPTOPLASTY      TONSILLECTOMY      TYMPANOSTOMY TUBE PLACEMENT        History reviewed. No pertinent family history.   Social History     Tobacco Use    Smoking status: Some Days     Types: Cigarettes    Smokeless tobacco: Never    Tobacco comments:     Smokes Medical Marijuana-- Patient does not smoke tobacco.  Last smoked 1 month ago    Vaping Use    Vaping status: Never Used   Substance Use Topics    Alcohol use: Yes     Comment: socially    Drug use: Yes     Types: Marijuana     Comment: medical maijuana used edible last 2100 8.21.24      E-Cigarette/Vaping    E-Cigarette Use Never User       E-Cigarette/Vaping Substances    Nicotine No     THC No     CBD No     Flavoring No     Other No     Unknown No       I have reviewed and agree with the history as documented.     This is a 34-year-old male presenting for evaluation of pain originating from the left side of the neck radiating down the left shoulder down the posterior aspect of the left upper arm.  It has been ongoing for greater than a week he was seen here had a cardiac workup completed sent home on Robaxin.  He follow-up with primary care who ordered plain film imaging of the neck which was completed and interpreted as normal.  He states the pain is not improving he  is essentially unable to use the left arm secondary to pain with range of motion.  There is no history of direct trauma there is no history of contusion abrasion laceration swelling rash fever.      Neck Pain  Associated symptoms: no chest pain and no fever        Review of Systems   Constitutional:  Negative for chills and fever.   HENT:  Negative for ear pain and sore throat.    Eyes:  Negative for pain and visual disturbance.   Respiratory:  Negative for cough and shortness of breath.    Cardiovascular:  Negative for chest pain and palpitations.   Gastrointestinal:  Negative for abdominal pain and vomiting.   Genitourinary:  Negative for dysuria and hematuria.   Musculoskeletal:  Positive for neck pain. Negative for arthralgias and back pain.        Left-sided neck pain left-sided shoulder pain   Skin:  Negative for color change and rash.   Neurological:  Negative for seizures and syncope.   All other systems reviewed and are negative.          Objective       ED Triage Vitals [03/30/25 0931]   Temperature Pulse Blood Pressure Respirations SpO2 Patient Position - Orthostatic VS   97.6 °F (36.4 °C) 84 132/89 16 98 % Sitting      Temp Source Heart Rate Source BP Location FiO2 (%) Pain Score    Temporal Monitor Left arm -- 10 - Worst Possible Pain      Vitals      Date and Time Temp Pulse SpO2 Resp BP Pain Score FACES Pain Rating User   03/30/25 1330 -- 59 95 % 16 117/66 -- -- PP   03/30/25 1300 -- 70 99 % 16 132/93 -- -- PP   03/30/25 1236 -- -- -- -- -- 10 - Worst Possible Pain -- PP   03/30/25 1200 -- 57 96 % 16 100/57 10 - Worst Possible Pain -- PP   03/30/25 1130 -- 58 96 % 16 111/57 -- -- PP   03/30/25 1030 -- 62 97 % 16 119/82 8 -- PP   03/30/25 1002 -- -- -- -- -- 8 -- PP   03/30/25 1000 -- -- -- -- -- 8 -- PP   03/30/25 0931 97.6 °F (36.4 °C) 84 98 % 16 132/89 10 - Worst Possible Pain -- CLS            Physical Exam  Vitals reviewed.   Constitutional:       General: He is not in acute distress.      Appearance: Normal appearance. He is not ill-appearing, toxic-appearing or diaphoretic.   HENT:      Head: Normocephalic and atraumatic.      Right Ear: External ear normal.      Left Ear: External ear normal.   Eyes:      General: No scleral icterus.        Right eye: No discharge.         Left eye: No discharge.      Extraocular Movements: Extraocular movements intact.      Conjunctiva/sclera: Conjunctivae normal.   Neck:     Cardiovascular:      Rate and Rhythm: Normal rate.      Pulses: Normal pulses.   Pulmonary:      Effort: Pulmonary effort is normal. No respiratory distress.      Breath sounds: No stridor.   Musculoskeletal:         General: Tenderness present. No swelling, deformity or signs of injury.      Cervical back: Normal range of motion. No rigidity.      Right lower leg: No edema.      Left lower leg: No edema.   Skin:     General: Skin is warm.      Coloration: Skin is not jaundiced or pale.      Findings: No bruising, erythema, lesion or rash.      Comments: No overlying skin changes of lower neck shoulder or left arm.  No evidence of abscess warmth.  Normal radial pulse    Neurological:      General: No focal deficit present.      Mental Status: He is alert and oriented to person, place, and time. Mental status is at baseline.      Gait: Gait normal.   Psychiatric:         Mood and Affect: Mood normal.         Thought Content: Thought content normal.         Judgment: Judgment normal.         Results Reviewed       Procedure Component Value Units Date/Time    Basic metabolic panel [[id number]] Collected: 03/30/25 1124    Lab Status: Final result Specimen: Blood from Arm, Right Updated: 03/30/25 1205     Sodium 139 mmol/L      Potassium 4.3 mmol/L      Chloride 105 mmol/L      CO2 26 mmol/L      ANION GAP 8 mmol/L      BUN 17 mg/dL      Creatinine 0.89 mg/dL      Glucose 97 mg/dL      Calcium 9.1 mg/dL      eGFR 113 ml/min/1.73sq m     Narrative:      National Kidney Disease Foundation guidelines  for Chronic Kidney Disease (CKD):     Stage 1 with normal or high GFR (GFR > 90 mL/min/1.73 square meters)    Stage 2 Mild CKD (GFR = 60-89 mL/min/1.73 square meters)    Stage 3A Moderate CKD (GFR = 45-59 mL/min/1.73 square meters)    Stage 3B Moderate CKD (GFR = 30-44 mL/min/1.73 square meters)    Stage 4 Severe CKD (GFR = 15-29 mL/min/1.73 square meters)    Stage 5 End Stage CKD (GFR <15 mL/min/1.73 square meters)  Note: GFR calculation is accurate only with a steady state creatinine    Comprehensive metabolic panel [[id number]]  (Abnormal) Collected: 03/30/25 0959    Lab Status: Final result Specimen: Blood from Arm, Right Updated: 03/30/25 1039     Sodium 135 mmol/L      Potassium 5.5 mmol/L      Chloride 103 mmol/L      CO2 25 mmol/L      ANION GAP 7 mmol/L      BUN 18 mg/dL      Creatinine 0.84 mg/dL      Glucose 90 mg/dL      Calcium 9.2 mg/dL      AST 29 U/L      ALT 20 U/L      Alkaline Phosphatase 57 U/L      Total Protein 7.2 g/dL      Albumin 4.5 g/dL      Total Bilirubin 0.40 mg/dL      eGFR 115 ml/min/1.73sq m     Narrative:      National Kidney Disease Foundation guidelines for Chronic Kidney Disease (CKD):     Stage 1 with normal or high GFR (GFR > 90 mL/min/1.73 square meters)    Stage 2 Mild CKD (GFR = 60-89 mL/min/1.73 square meters)    Stage 3A Moderate CKD (GFR = 45-59 mL/min/1.73 square meters)    Stage 3B Moderate CKD (GFR = 30-44 mL/min/1.73 square meters)    Stage 4 Severe CKD (GFR = 15-29 mL/min/1.73 square meters)    Stage 5 End Stage CKD (GFR <15 mL/min/1.73 square meters)  Note: GFR calculation is accurate only with a steady state creatinine    CK [[id number]]  (Normal) Collected: 03/30/25 0959    Lab Status: Final result Specimen: Blood from Arm, Right Updated: 03/30/25 1039     Total  U/L     Lactic acid, plasma (w/reflex if result > 2.0) [[id number]]  (Normal) Collected: 03/30/25 0959    Lab Status: Final result Specimen: Blood from Arm, Right Updated: 03/30/25 1026     LACTIC ACID  1.4 mmol/L     Narrative:      Result may be elevated if tourniquet was used during collection.    CBC and differential [[id number]]  (Abnormal) Collected: 03/30/25 0959    Lab Status: Final result Specimen: Blood from Arm, Right Updated: 03/30/25 1011     WBC 10.70 Thousand/uL      RBC 4.54 Million/uL      Hemoglobin 13.7 g/dL      Hematocrit 39.1 %      MCV 86 fL      MCH 30.2 pg      MCHC 35.0 g/dL      RDW 13.7 %      MPV 9.4 fL      Platelets 343 Thousands/uL      nRBC 0 /100 WBCs      Segmented % 50 %      Immature Grans % 0 %      Lymphocytes % 39 %      Monocytes % 6 %      Eosinophils Relative 4 %      Basophils Relative 1 %      Absolute Neutrophils 5.34 Thousands/µL      Absolute Immature Grans 0.04 Thousand/uL      Absolute Lymphocytes 4.19 Thousands/µL      Absolute Monocytes 0.61 Thousand/µL      Eosinophils Absolute 0.45 Thousand/µL      Basophils Absolute 0.07 Thousands/µL             CT soft tissue neck with contrast   Final Interpretation by Bayron Christensen MD (03/30 1431)      1.  Redemonstrated enlargement of the lingual tonsils and prominent nasopharyngeal soft tissue, which are indeterminate. Recommend correlation with direct visualization to exclude a mass lesion.   2.  Otherwise, no acute findings on neck CT.      The study was marked in EPIC for immediate notification.      Workstation performed: [id number]         CT spine cervical without contrast   Final Interpretation by Bright Lima DO (03/30 1102)      No acute fracture or traumatic malalignment.      Multilevel mild cervical degenerative changes with mild canal stenosis.                  Resident: JOSUE QUINN I, the attending radiologist, have reviewed the images and agree with the final report above.      Workstation performed: [id number]         XR shoulder 2+ views LEFT    (Results Pending)       Procedures    ED Medication and Procedure Management   Prior to Admission Medications   Prescriptions Last Dose  Informant Patient Reported? Taking?   Dulera 200-5 MCG/ACT inhaler Not Taking  Yes No   Sig: Inhale 2 puffs every 12 (twelve) hours   Patient not taking: Reported on 3/30/2025   NON FORMULARY Not Taking Self Yes No   Sig: Medical Marijuana   Patient not taking: Reported on 3/11/2025   acetaminophen (TYLENOL) 500 mg tablet Not Taking  No No   Sig: Take 1 tablet (500 mg total) by mouth every 6 (six) hours as needed for mild pain   Patient not taking: Reported on 3/30/2025   albuterol (PROVENTIL HFA,VENTOLIN HFA) 90 mcg/act inhaler Not Taking Self Yes No   Sig: Inhale 2 puffs every 6 (six) hours as needed for wheezing   Patient not taking: Reported on 3/30/2025   cetirizine (ZyrTEC) 10 mg tablet Not Taking  Yes No   Sig: Take 10 mg by mouth daily   Patient not taking: Reported on 3/30/2025   chlorhexidine (PERIDEX) 0.12 % solution Not Taking  No No   Sig: Apply 15 mL to the mouth or throat 2 (two) times a day   Patient not taking: Reported on 3/30/2025   clindamycin (CLEOCIN) 300 MG capsule Not Taking  Yes No   Sig: Take 300 mg by mouth 4 (four) times a day   Patient not taking: Reported on 3/30/2025   esomeprazole (NexIUM) 40 MG capsule Not Taking  Yes No   Sig: Take 40 mg by mouth daily before breakfast   Patient not taking: Reported on 3/30/2025   fluticasone (FLONASE) 50 mcg/act nasal spray  Self Yes No   Si sprays into each nostril daily   ibuprofen (MOTRIN) 400 mg tablet Not Taking  No No   Sig: Take 1 tablet (400 mg total) by mouth every 6 (six) hours as needed for mild pain   Patient not taking: Reported on 3/30/2025   ipratropium (ATROVENT) 0.06 % nasal spray Not Taking  No No   Si sprays into each nostril 3 (three) times a day   Patient not taking: Reported on 3/30/2025   meclizine (ANTIVERT) 25 mg tablet Not Taking Self No No   Sig: Take 0.5 tablets (12.5 mg total) by mouth 3 (three) times a day as needed for dizziness   Patient not taking: Reported on 3/11/2025   methocarbamol (ROBAXIN) 500 mg  tablet Not Taking Self No No   Sig: Take 1 tablet (500 mg total) by mouth 2 (two) times a day   Patient not taking: Reported on 1/7/2021   methocarbamol (ROBAXIN) 500 mg tablet Not Taking  No No   Sig: Take 2 tablets (1,000 mg total) by mouth 2 (two) times a day as needed for muscle spasms   Patient not taking: Reported on 3/30/2025   methylPREDNISolone 4 MG tablet therapy pack Not Taking  No No   Sig: Use as directed on package   Patient not taking: Reported on 3/30/2025   montelukast (SINGULAIR) 10 mg tablet Not Taking Self Yes No   Sig: TAKE 1 TABLET BY MOUTH EVERY DAY AT NIGHT   Patient not taking: Reported on 3/11/2025   naproxen (Naprosyn) 500 mg tablet Not Taking  No No   Sig: Take 1 tablet (500 mg total) by mouth 2 (two) times a day with meals   Patient not taking: Reported on 3/30/2025      Facility-Administered Medications: None     Patient's Medications   Discharge Prescriptions    CYCLOBENZAPRINE (FLEXERIL) 10 MG TABLET    Take 1 tablet (10 mg total) by mouth 2 (two) times a day as needed for muscle spasms       Start Date: 3/30/2025 End Date: --       Order Dose: 10 mg       Quantity: 20 tablet    Refills: 0    PREDNISONE 50 MG TABLET    Take 1 tablet (50 mg total) by mouth daily       Start Date: 3/30/2025 End Date: --       Order Dose: 50 mg       Quantity: 5 tablet    Refills: 0       ED SEPSIS DOCUMENTATION   Time reflects when diagnosis was documented in both MDM as applicable and the Disposition within this note       Time User Action Codes Description Comment    3/30/2025 11:14 AM Yovani Soraes [M47.812] Cervical spine degeneration                  Yovani Soares PA-C  03/30/25 9410

## 2025-03-31 ENCOUNTER — NURSE TRIAGE (OUTPATIENT)
Dept: PHYSICAL THERAPY | Facility: OTHER | Age: 35
End: 2025-03-31

## 2025-03-31 DIAGNOSIS — M54.2 ACUTE NECK PAIN: Primary | ICD-10-CM

## 2025-03-31 NOTE — TELEPHONE ENCOUNTER
"Additional Information   Negative: Is this related to a work injury?   Negative: Is this related to an MVA?   Negative: Are you currently recieving homecare services?    Background - Initial Assessment  Clinical complaint: Left Sided Neck pain that started \"over a week or so\" ago. NO previous hx of neck pain but he does have a hx of left shoulder injury about 25-26 years ago. Patient states pain radiates down the left shoulder blade and left arm. No numbness or tingling sensation. No recent falls, car accident, work related injury or direct trauma to his neck or back. Seen by PCP on 03/20 who prescribed medication and ordered CT cervical spine that shows: multilevel mild cervical degenerative changes with mild canal stenosis. Pain is constant. Worse with movements. Patient described pain as aching, sharp, shooting, stabbing, throbbing, dull.  Date of onset: Over a week or so ago.  Frequency of pain: constant  Quality of pain: aching, dull, sharp, shooting, dull, throbbing, stabbing.    Protocols used: [facility] Protocol    "

## 2025-03-31 NOTE — TELEPHONE ENCOUNTER
Called the patient to complete the triage process started today @ 8:30 am.    Message call couls not be connected. Attempt 3 times with same results.    Referral Closed.  Triage can be completed when a call back is received.

## 2025-04-01 ENCOUNTER — RESULTS FOLLOW-UP (OUTPATIENT)
Dept: PULMONOLOGY | Facility: CLINIC | Age: 35
End: 2025-04-01

## 2025-04-01 PROBLEM — G47.19 EXCESSIVE DAYTIME SLEEPINESS: Status: ACTIVE | Noted: 2025-04-01

## 2025-04-01 PROBLEM — R06.83 SNORING: Status: ACTIVE | Noted: 2025-04-01

## 2025-04-02 ENCOUNTER — OFFICE VISIT (OUTPATIENT)
Dept: PULMONOLOGY | Facility: CLINIC | Age: 35
End: 2025-04-02
Payer: COMMERCIAL

## 2025-04-02 VITALS
WEIGHT: 216 LBS | OXYGEN SATURATION: 96 % | BODY MASS INDEX: 31.99 KG/M2 | HEART RATE: 75 BPM | HEIGHT: 69 IN | DIASTOLIC BLOOD PRESSURE: 86 MMHG | TEMPERATURE: 97.9 F | SYSTOLIC BLOOD PRESSURE: 136 MMHG

## 2025-04-02 DIAGNOSIS — R06.02 SHORTNESS OF BREATH: ICD-10-CM

## 2025-04-02 DIAGNOSIS — G47.33 OSA (OBSTRUCTIVE SLEEP APNEA): Primary | ICD-10-CM

## 2025-04-02 DIAGNOSIS — G47.19 EXCESSIVE DAYTIME SLEEPINESS: ICD-10-CM

## 2025-04-02 LAB

## 2025-04-02 PROCEDURE — 99213 OFFICE O/P EST LOW 20 MIN: CPT

## 2025-04-02 NOTE — PATIENT INSTRUCTIONS
PAP Therapy Initiation    I will have written a prescription for auto-CPAP 5-15 cmH2O.  You will ultimately receive your machine and regular supplies from a DME (durable medical equipment) company.  After your appointment with the DME for the initial set up you will need to schedule a follow-up appointment in the sleep office, this appointment should be 31-90 days after you receive the device.  You should be eligible for new supplies approximately every 3-6 months, depending on your insurance coverage.  If your mask doesn't fit well, please call the medical equipment company to schedule a formal mask fitting.  Insurance requires regular usage and periodic office follow ups for PAP therapy, to continue to cover supplies.  Insurance requires a follow-up in the office within 90 days of starting your new PAP device.  To schedule your follow up visit in the sleep office you can call: central scheduling ([phone]) or sleep office ([phone]).  To schedule the appointment with the DME you will need to contact them directly.      CMS/Insurance Requirements    Your insurance requires a face-to-face follow up visit within a 31-90 day period after starting CPAP.  Your insurance requires compliance with CPAP, which is at least 4 hours per night for 70% of the time. This must be done over a 30 day period and must occur within the initial 31-90 day period after starting CPAP.  Your insurance also requires at least yearly follow ups to continue to pay for CPAP supplies.    PAP Supply Guidelines    Below are the guidelines for reordering your supplies. You will be responsible for your deductible, co payments, and out of pocket expenses.    Item Frequency   Nasal Mask (no headgear) 1 every 3 months   Nasal Mask Cushion 1 every 2 weeks   Full Face Mask (no headgear) 1 every 3 months   Full Face Mask Cushion 1 every month   Nasal Pillows 1 every 2 weeks   Headgear 1 every 6 months   hin Strap 1 every 6 months   carla 1 every 3  months   Filters: Reusable 1 every 6 months   Filters: Disposable 1 every 2 weeks   Humidifier Chamber(disposable) 1 every 6 months       About Your PAP Therapy    Continuous Positive Airway Pressure/Bilevel Therapy  You have been prescribed Positive Airway Pressure (PAP) therapy to treat sleep apnea. The most common type of sleep apnea is obstructive sleep apnea (OPAL), a condition in which the upper airway collapses during sleep. This obstruction keeps air from getting into your lungs. The prescribed PAP machine (CPAP or Bilevel or ASV) will smoothly blow air into your airway to prevent it from closing. The machine will use a mask to deliver the air through your nose and/or mouth. These devices are available in various sizes and styles.    It will take some time for you to get used to the new equipment and it may take a while for you to begin to feel the benefits of PAP therapy. Please be patient. If you are having problems adjusting to your machine, please contact us for help.    Beginning your PAP Therapy  Assembly:  Place your PAP machine on a level surface near your bed.  To prevent injury, do not place the machine higher than your head.  Keep the machine at least 12 inches away from anything that may block the vents.  Plug the machine into a properly grounded electrical outlet. It is better to avoid using an extension cord. If necessary, use a heavy-duty one.  If you are NOT using a humidifier with you PAP equipment:  Attach one end of your 6-foot tubing to the PAP unit outlet and the other end to your mask. (If your mask does not have a built-in exhalation port, a special exhalation valve should be used between the mask and the 6-foot tubing.)  Attach the headgear to your mask.  If you are using a humidifier with your PAP equipment:  Fill the humidifier with DISTILLED water to the maximum fill line.  Attach the humidifier to the PAP unit's outlet as instructed by the respiratory therapist with your home care  "company. Attach one end of your 6-foot tubing to the humidifier outlet and the other end to your mask. (If your mask does not have a built-in exhalation port, a special exhalation valve should be used between the mask and the 6-foot tubing.)  If you have been prescribed oxygen to be used with the PAP machine, you will be instructed on how to attach your oxygen tubing. Always turn off the oxygen tank before turning off your PAP machine.    Getting Started:  Wash your face and apply the mask and headgear as instructed by the sleep technologist or respiratory therapist. The mask should fit snugly to prevent air leaks, but not so tight as to cause skin irritation or discomfort.  Turn the PAP power switch to the ON position. You should feel air blowing through the mask. Breathe normally and adjust the mask gently if you feel an air leak.  If there are no leaks, activate the \"ramp\" feature on your PAP machine. The ramp allows a lower pressure to be delivered for a designated period of time (usually 5 to 45 minutes) to allow you to relax and  fall asleep more easily. The pressure will then gradually increase to the prescribed pressure that controls your apnea.  Remember to turn OFF your PAP unit when it is not in use.    Care and Maintenance    Headgear should be washed as needed. Daily inspection and weekly washings are recommended. Do not disassemble the straps. Machine wash in warm water, making sure to attach Velcro hooks and tabs before washing. Line dry or machine dry on a low setting.  Masks should be washed every day. Daily inspection is recommended. Leave the mask and tubing attached. Gently wash the mask with a soft cloth using warm water and mild detergent, concentrating on the mask cushion flaps. DO NOT use alcohol or bleach. Rinse thoroughly and air dry.  Tubing and headgear should be washed weekly. Daily inspection is recommended. Wash in warm water and mild detergent and rinse thoroughly. Hook the tubing to " the machine and blow until dry.  Humidifier should be washed daily and filled with DISTILLED water before use. Wash with warm water and mild detergent. Disinfect weekly by soaking with a solution of 1 part white vinegar and 3 parts water for 30 minutes. Rinse thoroughly and air dry.  Disposable filters should be replaced once a month. Wash reusable foam filters with warm water and mild detergent at least once a month. Rinse thoroughly and dry with paper towels.  Avoid  that contain fragrance or conditioners, as these will leave a residue.  NEVER iron any soft goods.    Troubleshooting    If the machine fails to turn on:  Make sure that the PAP machine is plugged into a grounded outlet.  Make sure that the ON/OFF switch is in the ON position.  Make sure that the wall outlet has power.    If there is no pressure coming from your machine:  Make sure that the inlet filter is clean and unblocked.  Make sure that the cooling fan is unblocked and that air is flowing freely.  Make sure that all tubing is securely connected.    If your PAP machine still fails to operate, please call your DME for assistance.    What You Should Know About Insurance    There are many different insurance policies and coverage for PAP equipment will vary. Find out what your coverage provides and what your responsibilities are as a consumer. PAP therapy equipment is considered Durable Medical Equipment (DME). Here are a few questions to ask your insurance provider:  What benefits do I have for DME? Do I have a deductible and/or co pay for DME?  Is there a repair or replacement plan for durable medical equipment?  How often can I receive a replacement mask, tubing, headgear and filters?  Do I have to demonstrate that I am using my PAP device?  o How do I do that?    Important Information    It is very important to keep your PAP equipment and supplies clean. The life of the supplies depends on the care they receive. Under normal  circumstances, expect the mask and headgear to last 9-12 months. Refer to the owner's manual for more information.    Important Safety Reminders    Keep equipment free from obstruction.  Use your PAP equipment as directed.  DO NOT try to adjust your pressure setting.  DO NOT block the exhalation port or valve.  Keep filters clean to prevent overheating.  If a humidifier is being used, place it at a level lower than your head.  If using a heated humidifier, allow unit to cool before cleaning or refilling.  Follow safety guidelines regarding oxygen equipment.DO NOT operate multiple electrical devices from one outlet.  If you have a medical emergency, contact the Emergency Medical Services.

## 2025-04-02 NOTE — TELEPHONE ENCOUNTER
Additional Information   Negative: Has the patient had unexplained weight loss?   Negative: Does the patient have a fever?   Negative: Is the patient experiencing urine retention?   Negative: Is the patient experiencing acute drop foot or paralysis?   Negative: Has the patient experienced major trauma? (fall from height, high speed collision, direct blow to spine) and is also experiencing nausea, light-headedness, or loss of consciousness?   Negative: Is the patient experiencing blood in sputum?   Negative: Is this a chronic condition?    Protocols used: [facility] Protocol    [facility] was reviewed in detail and what we can provide for their neckpain.  Patient is agreeable to being triaged and would like to proceed with Physical Therapy.    Referral was placed for Physical Therapy at the [place] site. Patients information was sent to the  to make evaluation appointment. Patient made aware that the PT office  will be calling to schedule the appointment.  Patient was provided with the phone number to the PT office.    No further questions and/or concerns were voiced by the patient at this time. Patient states understanding of the referral that was placed.

## 2025-04-02 NOTE — PROGRESS NOTES
Progress note - Pulmonary Medicine   Dae Julio 35 y.o. male MRN: [id number]       Impression & Plan:   Dae Julio is a 35 y.o. male with PMHx of OPAL, asthma and obesity who presents for follow-up.    OPAL (obstructive sleep apnea)  Excessive daytime sleepiness  - The patient has a history of mild OPAL exacerbated severely during supine sleep diagnosed on PSG in 2025 (AHI 12.6, supine AHI 34.7, REM AHI 9.0, O2 eric 92%, TST <99% 0.0 min) and he is here today to discuss treatment options.  Given his daytime sleepiness he would benefit from treatment.  - Discussed with the patient the possible diagnosis, causes and conditions associated with SDB along with potential treatments.  He has decided to proceed with PAP therapy.  - Will start APAP 5-15 cmH2O with a nasal/nasal pillows mask, and informed the patient they can change out their mask for free within the first 30 days of having their machine.  - Explained the importance of keeping the machine clean, and that they should be eligible for refills of supplies every 3-6 months depending on insurance.  We also discussed the insurance compliance requirements.  - Encouraged healthy lifestyle with adequate sleep (7-9 hours per night), healthy balanced diet and routine exercise.  Explained the importance of avoiding driving while drowsy.  - Follow-up 31-90 days after receiving device.  -     CPAP Auto New DME  -     CPAP Package    Shortness of breath  - Now resolved, no longer requiring inhalers and MCT was negative.  No further work-up required.  Recommend continued daily activity to maintain exercise tolerance.    Return in about 3 months (around 7/2/2025).  ______________________________________________________________________    HPI:    Dae Julio is a 35 y.o. male with PMHx of OPAL, asthma and obesity who presents for follow-up.  The patient was last seen via telemedicine on 9/20/2024 at which time plan was to continue with Dulera 100 mcg, cetirizine,  montelukast, Flonase and ipratropium nasal sprays, check MCT and have PSG as previously scheduled.  MCT was negative and PSG did show mild OPAL which exacerbated severely during supine sleep.  He is now returning to discuss results.  He reports that since his last OV he has made dietary changes and increased his daily exercise and as a result has stopped using his inhalers.  He had an EGD with GI which was unremarkable and he stopped the esomeprazole.  He denies any significant dyspnea, cough, wheezing or dyspepsia at present.  He does still note having daytime sleepiness and snoring and is hopeful the quality of his sleep can improve with treatment of his sleep apnea.    Current Medications:    Current Outpatient Medications:     cyclobenzaprine (FLEXERIL) 10 mg tablet, Take 1 tablet (10 mg total) by mouth 2 (two) times a day as needed for muscle spasms, Disp: 20 tablet, Rfl: 0    methocarbamol (ROBAXIN) 500 mg tablet, Take 2 tablets (1,000 mg total) by mouth 2 (two) times a day as needed for muscle spasms, Disp: 30 tablet, Rfl: 0    predniSONE 50 mg tablet, Take 1 tablet (50 mg total) by mouth daily, Disp: 5 tablet, Rfl: 0    acetaminophen (TYLENOL) 500 mg tablet, Take 1 tablet (500 mg total) by mouth every 6 (six) hours as needed for mild pain (Patient not taking: Reported on 3/11/2025), Disp: 30 tablet, Rfl: 0    albuterol (PROVENTIL HFA,VENTOLIN HFA) 90 mcg/act inhaler, Inhale 2 puffs every 6 (six) hours as needed for wheezing (Patient not taking: Reported on 3/30/2025), Disp: , Rfl:     cetirizine (ZyrTEC) 10 mg tablet, Take 10 mg by mouth daily (Patient not taking: Reported on 3/11/2025), Disp: , Rfl:     chlorhexidine (PERIDEX) 0.12 % solution, Apply 15 mL to the mouth or throat 2 (two) times a day (Patient not taking: Reported on 3/30/2025), Disp: 120 mL, Rfl: 0    clindamycin (CLEOCIN) 300 MG capsule, Take 300 mg by mouth 4 (four) times a day (Patient not taking: Reported on 3/30/2025), Disp: , Rfl:      Dulera 200-5 MCG/ACT inhaler, Inhale 2 puffs every 12 (twelve) hours (Patient not taking: Reported on 3/30/2025), Disp: , Rfl:     esomeprazole (NexIUM) 40 MG capsule, Take 40 mg by mouth daily before breakfast (Patient not taking: Reported on 3/11/2025), Disp: , Rfl:     fluticasone (FLONASE) 50 mcg/act nasal spray, 2 sprays into each nostril daily, Disp: , Rfl:     ibuprofen (MOTRIN) 400 mg tablet, Take 1 tablet (400 mg total) by mouth every 6 (six) hours as needed for mild pain (Patient not taking: Reported on 3/11/2025), Disp: 30 tablet, Rfl: 0    ipratropium (ATROVENT) 0.06 % nasal spray, 2 sprays into each nostril 3 (three) times a day (Patient not taking: Reported on 3/11/2025), Disp: 15 mL, Rfl: 3    meclizine (ANTIVERT) 25 mg tablet, Take 0.5 tablets (12.5 mg total) by mouth 3 (three) times a day as needed for dizziness (Patient not taking: Reported on 3/11/2025), Disp: 30 tablet, Rfl: 0    methocarbamol (ROBAXIN) 500 mg tablet, Take 1 tablet (500 mg total) by mouth 2 (two) times a day (Patient not taking: Reported on 1/7/2021), Disp: 20 tablet, Rfl: 0    methylPREDNISolone 4 MG tablet therapy pack, Use as directed on package (Patient not taking: Reported on 3/30/2025), Disp: 21 tablet, Rfl: 0    montelukast (SINGULAIR) 10 mg tablet, TAKE 1 TABLET BY MOUTH EVERY DAY AT NIGHT (Patient not taking: Reported on 3/11/2025), Disp: , Rfl:     naproxen (Naprosyn) 500 mg tablet, Take 1 tablet (500 mg total) by mouth 2 (two) times a day with meals (Patient not taking: Reported on 3/11/2025), Disp: 14 tablet, Rfl: 0    NON FORMULARY, Medical Marijuana (Patient not taking: Reported on 3/11/2025), Disp: , Rfl:     Review of Systems:  Review of Systems  10-point system review completed, all of which are negative except as mentioned above.    Past medical history, surgical history, and family history were reviewed and updated as appropriate    Social history updates:  Social History     Tobacco Use   Smoking Status Some  "Days    Types: Cigarettes   Smokeless Tobacco Never   Tobacco Comments    Smokes Medical Marijuana-- Patient does not smoke tobacco.  Last smoked 1 month ago      PhysicalExamination:  Vitals:   /86 (BP Location: Right arm, Patient Position: Sitting, Cuff Size: Standard)   Pulse 75   Temp 97.9 °F (36.6 °C) (Temporal)   Ht 5' 9\" (1.753 m)   Wt 98 kg (216 lb)   SpO2 96%   BMI 31.90 kg/m²   Body mass index is 31.9 kg/m².    Constitutional: NAD, on room air, no conversational dyspnea   Skin: Warm, dry, no rashes noted   Eyes: PERRL, normal conjunctiva  ENT: Nasal congestion absent, moist mucus membranes.  Neck: No JVD, trachea is midline, no adenopathy.  Resp: CTA B/L, no W/R/R   Cardiac: RRR, +S1/S2, no M/R/G  Extremities: No digital clubbing or pedal edema  Neuro: AAOx3    Diagnostic Data:  Labs:  I personally reviewed the most recent laboratory data pertinent to today's visit    Lab Results   Component Value Date    WBC 10.70 (H) 03/30/2025    HGB 13.7 03/30/2025    HCT 39.1 03/30/2025    MCV 86 03/30/2025     03/30/2025     Lab Results   Component Value Date    SODIUM 139 03/30/2025    K 4.3 03/30/2025    CO2 26 03/30/2025     03/30/2025    BUN 17 03/30/2025    CREATININE 0.89 03/30/2025    CALCIUM 9.1 03/30/2025     PFT results:  The most recent pulmonary function tests were reviewed.  Methacholine Challenge -- 10/11/2024  FVC pre 5.05L/96%, post 5.07L/+0  FEV pre 3.73L/86%, post 4.00/+7  FEV1/FVC pre 74, post 79    Normal spirometry, negative MCT    PFTs w/ BD -- 7/24/2024  FVC:   5.05L  96% predicted        Z-score: .-0.28   FEV1: 3.94L  91% predicted       Z-score: -0.68   FEV1/FVC Ratio:                        Z-score: -1      After administration of bronchodilator:  FVC:     5.27L   +4 % change   FEV1:   4.18L   +6 % change      Lung volumes by body plethysmography:  Total Lung Capacity:    7.46L    Z-score: 0.96   Residual volume:          2.23L    % predicted: 138  RV/TLC Ratio:      " "         124 %                      DLCO corrected for patients hemoglobin level: 27.14, 86% predicted  Z-score: -1.02      PFTs -- 4/18/2023 ([facility])  FVC 4.07L 107%  FEV1 3.34L 100%  FEV1/FVC 77  TLC and RV were reported to be normal  DLCO was reported to be mildly reduced     Imaging:  I personally reviewed the images in PACS pertinent to today's visit:  CXR 2 View -- 3/11/2025  No acute cardiopulmonary disease.     Other studies:  PSG -- 3/14/2025 (Wt 215.0 lbs)  AHI - 12.6 (5.2 CMS)  Sup AHI - 34.7  REM AHI - 9.0  O2 Ezequiel - 92.0%  TST < 89% - 0.0 min  PLMI - 0.0  PLM D[place]in - 0.0      Bev Diallo MD  Pulmonary-Critical Care and Sleep Medicine  04/02/25    Portions of the record may have been created with voice recognition software. Occasional wrong word or \"sound a like\" substitutions may have occurred due to the inherent limitations of voice recognition software. Please read the chart carefully and recognize, using context, where substitutions have occurred.  "

## 2025-04-03 ENCOUNTER — EVALUATION (OUTPATIENT)
Dept: PHYSICAL THERAPY | Facility: CLINIC | Age: 35
End: 2025-04-03
Payer: COMMERCIAL

## 2025-04-03 VITALS — SYSTOLIC BLOOD PRESSURE: 141 MMHG | TEMPERATURE: 97.8 F | HEART RATE: 82 BPM | DIASTOLIC BLOOD PRESSURE: 85 MMHG

## 2025-04-03 DIAGNOSIS — M54.2 ACUTE NECK PAIN: ICD-10-CM

## 2025-04-03 PROCEDURE — 97162 PT EVAL MOD COMPLEX 30 MIN: CPT | Performed by: PHYSICAL THERAPIST

## 2025-04-03 PROCEDURE — 97110 THERAPEUTIC EXERCISES: CPT | Performed by: PHYSICAL THERAPIST

## 2025-04-03 PROCEDURE — 97012 MECHANICAL TRACTION THERAPY: CPT | Performed by: PHYSICAL THERAPIST

## 2025-04-03 NOTE — LETTER
2025    Deborah Obrien MD  [address] [place] [id number]    Patient: Dae Julio   YOB: 1990   Date of Visit: 4/3/2025     Encounter Diagnosis     ICD-10-CM    1. Acute neck pain  M54.2 Ambulatory referral to PT spine          Dear Dr. Deborah Obrien MD:    This patient has been referred to physical therapy via the comprehensive spine program.  Please review the attached evaluation summary from Dae's recent visit.     Please verify that you agree with the plan of care by signing the attached order.     If you have any questions or concerns, please do not hesitate to call.     I sincerely appreciate the opportunity to share in the care of one of your patients and hope to have another opportunity to work with you in the near future.       Sincerely,    Wilma Honeycutt, PT      Referring Provider:      I certify that I have read the below Plan of Care and certify the need for these services furnished under this plan of treatment while under my care.                    Deborah Obrien MD  [address] [place] [id number]  Via Fax: [phone]          PT Evaluation     Today's date: 4/3/2025  Patient name: Dae Julio  : 1990  MRN: [id number]  Referring provider: Duglas Hartman DO  Dx:   Encounter Diagnosis     ICD-10-CM    1. Acute neck pain  M54.2 Ambulatory referral to PT spine                     Assessment  Symptom irritability: high    Assessment details: Dae Julio is a pleasant 35 y.o. presenting to physical therapy with MD referral for Acute neck pain. Pt presents with high irritability of his cervical spine pain and radicular symptoms. He presents with intact reflexes, no myotomal weakness, and slight reduction in sensation to light touch over medial upper arm on LUE. Pt's symptoms peripheralize with both cervical flexion and extension; however, centralize with cervical traction. At this time no further referral is required. Pt may require  future referral to pain management if relief from mechanical traction does not provide progressive lasting relief as he has not had much improvement with two rounds of oral steroids over the past 30 days.    Problem list: Poor posture, decreased upper extremity strength, limited cervical ROM, increased muscle tension, and increased neural tension.    Treatment to include: Manual therapy techniques, cervical/thoracic ROM, mechanical cervical traction, RTC/periscapular strengthening, neural mobility exercises, postural re-education, instruction in a comprehensive HEP, and modalities as needed.    This pt would benefit from skilled PT services to address their impairments and functional limitations to maximize functional outcome.       Barriers to therapy: Chronicity of thoracic spine pain, costochondral chest pain  Understanding of Dx/Px/POC: good     Prognosis: good    Goals  ST. Pt will improve cervical extension to at least 45 degrees in 4 weeks.  2. Pt will improve cervical rotation to the left to at least 75 degrees in 4 weeks.    LT. Pt will be able to lift/carry/hold his infant with minimal to no pain in 8 weeks.  2. Pt will be independent in a comprehensive HEP in 8 weeks.   3. Pt will be able to look up to wash his hair with minimal to no pain in 8 weeks.    Plan  Patient would benefit from: skilled physical therapy    Frequency: 2-3x week  Duration in weeks: 8  Plan of Care beginning date: 4/3/2025  Plan of Care expiration date: 2025  Treatment plan discussed with: patient  Plan details: START Back tool: moderate risk (6-8 visits)  Cervical treatment classification: traction        Subjective Evaluation    History of Present Illness  Mechanism of injury: Pt states he has been dealing with costochondritis for approximately 2 years. He has been seeing GI to rule out GERD as well as pulmonology to rule out asthma. ER 3-11-25 for left sided thoracic back and shoulder pain without CONOR. Woke up  "3-09-25 with left sided neck pain that radiated down left arm into digits 1-3. EKG performed which was negative for cardiac issues. Pt discharged with steroid pack and muscle relaxors. On 3-30-25, pt returned to ER due to worsening pain in cervical spine and left arm. CT of cervical spine and CT of soft tissue of neck performed with results noted below. Pt was discharged on prednisone with a referral to physical therapy. Pt saw PCP's office, Dr. Obrien, who ordered an x-ray of cervical spine which did not show any significant findings. Pt is still does not feel significant improvement in symptoms with the medications at this point in time. Pt has noticed weakness in his left arm at this time.       CT of cervical spine 3-30-25: \"FINDINGS:     ALIGNMENT:  There is straightening of normal cervical lordosis.  No subluxation or compression deformity.     VERTEBRAE:  No fracture.     DEGENERATIVE CHANGES:     C2-3: No focal disc herniation.  No central canal narrowing or foraminal narrowing.     C3-4: Small central disc protrusion. Mild central canal narrowing.  No neural foraminal stenosis.  No facet arthrosis.     C4-5: Left paracentral disc protrusion with minimal endplate osteophyte formation. Mild canal narrowing.  No neural foraminal stenosis.  No facet arthrosis.     C5-6: Minor annular bulging and minimal endplate hypertrophic change. Mild central canal narrowing. No neural foraminal stenosis. No facet arthrosis.     C6-7: Minimal annular bulge. Mild central canal narrowing.  No neural foraminal stenosis.  No facet arthrosis.     C7-T1: No disc herniation.  No central canal narrowing.  No neural foraminal stenosis.  No facet arthrosis.     PREVERTEBRAL AND PARASPINAL SOFT TISSUES: Unremarkable     THORACIC INLET:  Normal.\"    Cervical spine x-ray 3-20-25 \"FINDINGS:     No fracture.     Straightening of the usual cervical lordosis.     Intervertebral disc heights are maintained.     Patent neuroforamina.     Normal " "prevertebral soft tissues.     Clear lung apices.\"    CT of soft tissue with contrast  IMPRESSION:     1.  Redemonstrated enlargement of the lingual tonsils and prominent nasopharyngeal soft tissue, which are indeterminate. Recommend correlation with direct visualization to exclude a mass lesion.  2.  Otherwise, no acute findings on neck CT.    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  Exacerbated by: cervical extension.  Progression: worsening      Diagnostic Tests  X-ray: normal  CT scan: abnormal  Treatments  Previous treatment: medication  Current treatment: medication        Objective     Postural Observations  Seated posture: fair  Standing posture: fair  Correction of posture: makes symptoms worse    Additional Postural Observation Details  Moderate forward rounded shoulders and forward head posture    Palpation     Right   Muscle spasm in the levator scapulae, scalenes and upper trapezius.   Tenderness of the levator scapulae, scalenes and upper trapezius. "     Neurological Testing     Sensation   Cervical/Thoracic   Left   Intact: light touch  Diminished: light touch    Right   Intact: light touch    Comments   Left light touch: upper inner arm.     Reflexes   Left   Biceps (C5/C6): normal (2+)  Brachioradialis (C6): normal (2+)  Triceps (C7): normal (2+)    Right   Biceps (C5/C6): normal (2+)  Brachioradialis (C6): normal (2+)  Triceps (C7): normal (2+)    Active Range of Motion   Cervical/Thoracic Spine       Cervical    Flexion: 52 degrees  with pain  Extension: 38 (radiating into left periscapular area) degrees     with pain  Left lateral flexion: 42 (pain down left arm) degrees     with pain  Right lateral flexion: 42 degrees      Left rotation: 70 (cervical spine pain) degrees with pain  Right rotation: 80 degrees       Thoracic    Extension:  Restriction level: moderate    Strength/Myotome Testing   Cervical Spine     Left   Interossei strength (t1): 5    Right   Interossei strength (t1): 5    Left Shoulder     Planes of Motion   Flexion: 5   External rotation at 0°: 5   Internal rotation at 0°: 5     Right Shoulder     Planes of Motion   Flexion: 5   Abduction: 5   External rotation at 0°: 5   Internal rotation at 0°: 5     Left Elbow   Flexion: 5  Extension: 4    Right Elbow   Flexion: 5  Extension: 5    Left Wrist/Hand   Wrist extension: 5  Wrist flexion: 5  Thumb extension: 5    Right Wrist/Hand   Wrist extension: 5  Wrist flexion: 5  Thumb extension: 5    Tests   Cervical   Negative alar ligament test, transverse ligament test and VBI.     Additional Tests Details  Pt peripheralized with cervical flex/extension  Symptoms centralized and cervical pain reduced with manual cervical traction in 10 degrees cervical flexion.            Precautions: costochondral chest pain  CO-MORBIDITIES:  HEP ACCESS CODE: Educated pt to avoid positions that cause increase in radicular symptoms  FOTO Completed On: 4-3-25    POC Expires Reeval for Medicare to be completed  "Re-eval last completed on Unit Limit Auth Start Date Auth Expiration Date PT/OT/ST  Visit Limit   5-30-25 By visit  10  BOMN NA NA 24 prior to needing authorization                         TREATMENT DIARY  Auth Status DATE 4-3 (IE)            NA Visit # 1             Remaining 9            MANUAL THERAPY             STM to L UT/LS NV            STM to L scalenes NV                                                                THERAPEUTIC EXERCISE             UBE (ant/post) to improve UE strength/ROM 2'/2' 120 rpm NV                         Corner stretch low hands 4 x 30\" NV                         Seated:             - L UT stretch 4 x 30\" NV            - L LS stretch 4  x 30\" NV                                                                                          NEUROMUSCULAR REEDUCATION              Seated:             - scapular retraction 10 x 5\" NV                                                                                                                                                                                                                                                                   THERAPEUTIC ACTIVITY                                                                                                                                                                                                                             GAIT TRAINING                                                                              MODALITIES             Mechanical cervical traction:  - 10 degrees flexion  - 50% rope speed  - 5 steps up/down 10 mins, 12# pull            Cryo to cervical spine/L UT 10 mins NV            Estim to L UT/LS 10 mins NV                 * On initial evaluation, educated pt on anatomy, pathology, and exercise rationale. Educated pt to call with any questions or concerns.  Educated pt to try alternating which side he feeds the baby bottles on to reduce repetitive load on one side of his " body.

## 2025-04-03 NOTE — PROGRESS NOTES
PT Evaluation     Today's date: 4/3/2025  Patient name: Dae Julio  : 1990  MRN: [id number]  Referring provider: Duglas Hartman DO  Dx:   Encounter Diagnosis     ICD-10-CM    1. Acute neck pain  M54.2 Ambulatory referral to PT spine                     Assessment  Symptom irritability: high    Assessment details: Dae Julio is a pleasant 35 y.o. presenting to physical therapy with MD referral for Acute neck pain. Pt presents with high irritability of his cervical spine pain and radicular symptoms. He presents with intact reflexes, no myotomal weakness, and slight reduction in sensation to light touch over medial upper arm on LUE. Pt's symptoms peripheralize with both cervical flexion and extension; however, centralize with cervical traction. At this time no further referral is required. Pt may require future referral to pain management if relief from mechanical traction does not provide progressive lasting relief as he has not had much improvement with two rounds of oral steroids over the past 30 days.    Problem list: Poor posture, decreased upper extremity strength, limited cervical ROM, increased muscle tension, and increased neural tension.    Treatment to include: Manual therapy techniques, cervical/thoracic ROM, mechanical cervical traction, RTC/periscapular strengthening, neural mobility exercises, postural re-education, instruction in a comprehensive HEP, and modalities as needed.    This pt would benefit from skilled PT services to address their impairments and functional limitations to maximize functional outcome.       Barriers to therapy: Chronicity of thoracic spine pain, costochondral chest pain  Understanding of Dx/Px/POC: good     Prognosis: good    Goals  ST. Pt will improve cervical extension to at least 45 degrees in 4 weeks.  2. Pt will improve cervical rotation to the left to at least 75 degrees in 4 weeks.    LT. Pt will be able to lift/carry/hold his infant  "with minimal to no pain in 8 weeks.  2. Pt will be independent in a comprehensive HEP in 8 weeks.   3. Pt will be able to look up to wash his hair with minimal to no pain in 8 weeks.    Plan  Patient would benefit from: skilled physical therapy    Frequency: 2-3x week  Duration in weeks: 8  Plan of Care beginning date: 4/3/2025  Plan of Care expiration date: 5/30/2025  Treatment plan discussed with: patient  Plan details: START Back tool: moderate risk (6-8 visits)  Cervical treatment classification: traction        Subjective Evaluation    History of Present Illness  Mechanism of injury: Pt states he has been dealing with costochondritis for approximately 2 years. He has been seeing GI to rule out GERD as well as pulmonology to rule out asthma. ER 3-11-25 for left sided thoracic back and shoulder pain without CONOR. Woke up 3-09-25 with left sided neck pain that radiated down left arm into digits 1-3. EKG performed which was negative for cardiac issues. Pt discharged with steroid pack and muscle relaxors. On 3-30-25, pt returned to ER due to worsening pain in cervical spine and left arm. CT of cervical spine and CT of soft tissue of neck performed with results noted below. Pt was discharged on prednisone with a referral to physical therapy. Pt saw PCP's office, Dr. Obrien, who ordered an x-ray of cervical spine which did not show any significant findings. Pt is still does not feel significant improvement in symptoms with the medications at this point in time. Pt has noticed weakness in his left arm at this time.       CT of cervical spine 3-30-25: \"FINDINGS:     ALIGNMENT:  There is straightening of normal cervical lordosis.  No subluxation or compression deformity.     VERTEBRAE:  No fracture.     DEGENERATIVE CHANGES:     C2-3: No focal disc herniation.  No central canal narrowing or foraminal narrowing.     C3-4: Small central disc protrusion. Mild central canal narrowing.  No neural foraminal stenosis.  No facet " "arthrosis.     C4-5: Left paracentral disc protrusion with minimal endplate osteophyte formation. Mild canal narrowing.  No neural foraminal stenosis.  No facet arthrosis.     C5-6: Minor annular bulging and minimal endplate hypertrophic change. Mild central canal narrowing. No neural foraminal stenosis. No facet arthrosis.     C6-7: Minimal annular bulge. Mild central canal narrowing.  No neural foraminal stenosis.  No facet arthrosis.     C7-T1: No disc herniation.  No central canal narrowing.  No neural foraminal stenosis.  No facet arthrosis.     PREVERTEBRAL AND PARASPINAL SOFT TISSUES: Unremarkable     THORACIC INLET:  Normal.\"    Cervical spine x-ray 3-20-25 \"FINDINGS:     No fracture.     Straightening of the usual cervical lordosis.     Intervertebral disc heights are maintained.     Patent neuroforamina.     Normal prevertebral soft tissues.     Clear lung apices.\"    CT of soft tissue with contrast  IMPRESSION:     1.  Redemonstrated enlargement of the lingual tonsils and prominent nasopharyngeal soft tissue, which are indeterminate. Recommend correlation with direct visualization to exclude a mass lesion.  2.  Otherwise, no acute findings on neck CT.    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  Exacerbated by: cervical extension.  Progression: worsening      Diagnostic Tests  X-ray: normal  CT scan: abnormal  Treatments  Previous treatment: medication  Current treatment: medication        Objective     Postural Observations  Seated posture: fair  Standing posture: fair  Correction of posture: makes symptoms worse    Additional Postural Observation Details  Moderate forward rounded shoulders and forward head posture    Palpation     Right   Muscle spasm in the levator scapulae, scalenes and upper trapezius.   Tenderness of the levator scapulae, scalenes and upper trapezius.     Neurological Testing     Sensation   Cervical/Thoracic   Left   Intact: light touch  Diminished: light touch    Right   Intact: light touch    Comments   Left light touch: upper inner arm.     Reflexes   Left   Biceps (C5/C6): normal (2+)  Brachioradialis (C6): normal (2+)  Triceps (C7): normal (2+)    Right   Biceps (C5/C6): normal (2+)  Brachioradialis (C6): normal (2+)  Triceps (C7): normal (2+)    Active Range of Motion   Cervical/Thoracic Spine       Cervical    Flexion: 52 degrees  with pain  Extension: 38 (radiating into left periscapular area) degrees     with pain  Left lateral flexion: 42 (pain down left arm) degrees     with pain  Right lateral flexion: 42 degrees      Left rotation: 70 (cervical spine pain) degrees with pain  Right rotation: 80 degrees       Thoracic    Extension:  Restriction level: moderate    Strength/Myotome Testing   Cervical Spine     Left   Interossei strength (t1): 5    Right   Interossei strength (t1): 5    Left Shoulder     Planes of Motion   Flexion: 5   External rotation at 0°: 5   Internal rotation at 0°: 5     Right Shoulder     Planes of  "Motion   Flexion: 5   Abduction: 5   External rotation at 0°: 5   Internal rotation at 0°: 5     Left Elbow   Flexion: 5  Extension: 4    Right Elbow   Flexion: 5  Extension: 5    Left Wrist/Hand   Wrist extension: 5  Wrist flexion: 5  Thumb extension: 5    Right Wrist/Hand   Wrist extension: 5  Wrist flexion: 5  Thumb extension: 5    Tests   Cervical   Negative alar ligament test, transverse ligament test and VBI.     Additional Tests Details  Pt peripheralized with cervical flex/extension  Symptoms centralized and cervical pain reduced with manual cervical traction in 10 degrees cervical flexion.            Precautions: costochondral chest pain  CO-MORBIDITIES:  HEP ACCESS CODE: Educated pt to avoid positions that cause increase in radicular symptoms  FOTO Completed On: 4-3-25    POC Expires Reeval for Medicare to be completed Re-eval last completed on Unit Limit Auth Start Date Auth Expiration Date PT/OT/ST  Visit Limit   5-30-25 By visit  10  BOMN NA NA 24 prior to needing authorization                         TREATMENT DIARY  Auth Status DATE 4-3 (IE)            NA Visit # 1             Remaining 9            MANUAL THERAPY             STM to L UT/LS NV            STM to L [place] NV                                                                THERAPEUTIC EXERCISE             UBE (ant/post) to improve UE strength/ROM 2'/2' 120 rpm NV                         Corner stretch low hands 4 x 30\" NV                         Seated:             - L UT stretch 4 x 30\" NV            - L LS stretch 4  x 30\" NV                                                                                          NEUROMUSCULAR REEDUCATION              Seated:             - scapular retraction 10 x 5\" NV                                                                                                                                                                                                                                              "                      THERAPEUTIC ACTIVITY                                                                                                                                                                                                                             GAIT TRAINING                                                                              MODALITIES             Mechanical cervical traction:  - 10 degrees flexion  - 50% rope speed  - 5 steps up/down 10 mins, 12# pull            Cryo to cervical spine/L UT 10 mins NV            Estim to L UT/LS 10 mins NV                 * On initial evaluation, educated pt on anatomy, pathology, and exercise rationale. Educated pt to call with any questions or concerns.  Educated pt to try alternating which side he feeds the baby bottles on to reduce repetitive load on one side of his body.

## 2025-04-09 ENCOUNTER — OFFICE VISIT (OUTPATIENT)
Dept: PHYSICAL THERAPY | Facility: CLINIC | Age: 35
End: 2025-04-09
Payer: COMMERCIAL

## 2025-04-09 DIAGNOSIS — M54.2 ACUTE NECK PAIN: Primary | ICD-10-CM

## 2025-04-09 PROCEDURE — 97012 MECHANICAL TRACTION THERAPY: CPT | Performed by: PHYSICAL THERAPIST

## 2025-04-09 PROCEDURE — 97110 THERAPEUTIC EXERCISES: CPT | Performed by: PHYSICAL THERAPIST

## 2025-04-09 PROCEDURE — 97140 MANUAL THERAPY 1/> REGIONS: CPT | Performed by: PHYSICAL THERAPIST

## 2025-04-09 NOTE — PROGRESS NOTES
"Daily Note     Today's date: 2025  Patient name: Dae Julio  : 1990  MRN: [id number]  Referring provider: Deborah Obrien MD  Dx:   Encounter Diagnosis     ICD-10-CM    1. Acute neck pain  M54.2                      Subjective: Pt reprots left arm pain prior to start of session. He did note reduced pain with mechanical traction after his first visit.       Objective: See treatment diary below      Assessment: Tolerated treatment well. Patient exhibited good technique with therapeutic exercises and would benefit from continued PT  Added SOR this date, and increased mechanical traction to 18# this date with good tolerance. Continues to display centralization of sx during traction. Pt given UT levator and doorway stretch to complete at home. Patient was provided a home exercise program and demonstrated an understanding of exercises.  Patient was advised to stop performing home exercise program if symptoms increase or new complaints developed.  Verbal understanding demonstrated regarding home exercise program instructions.        Plan: Continue per plan of care.      Precautions: costochondral chest pain  CO-MORBIDITIES:  HEP ACCESS CODE: Educated pt to avoid positions that cause increase in radicular symptoms  FOTO Completed On: 4-3-25    POC Expires Reeval for Medicare to be completed Re-eval last completed on Unit Limit Auth Start Date Auth Expiration Date PT/OT/ST  Visit Limit   25 By visit  10  BOMN NA NA 24 prior to needing authorization                         TREATMENT DIARY  Auth Status DATE 4-3 (IE)            NA Visit # 1 2            Remaining 9 8           MANUAL THERAPY             STM to L UT/LS NV            STM to L billnes NV            SOR  RR                                                   THERAPEUTIC EXERCISE             UBE (ant/post) to improve UE strength/ROM 2'/2' 120 rpm NV 3/3 120                        Corner stretch low hands 4 x 30\" NV 30''4x                      " "  Seated:             - L UT stretch 4 x 30\" NV 30''4x           - L LS stretch 4  x 30\" NV 30'4x                                                                                         NEUROMUSCULAR REEDUCATION              Seated:             - scapular retraction 10 x 5\" NV 5''20x                                                                                                                                                                                                                                                                  THERAPEUTIC ACTIVITY                                                                                                                                                                                                                             GAIT TRAINING                                                                              MODALITIES             Mechanical cervical traction:  - 10 degrees flexion  - 50% rope speed  - 5 steps up/down 10 mins, 12# pull 18#, 3/3 10 deg, 30 percent rope speed. NO CI, Decreased sx post traction.            Cryo to cervical spine/L UT 10 mins NV            Estim to L UT/LS 10 mins NV                     "

## 2025-04-10 ENCOUNTER — APPOINTMENT (OUTPATIENT)
Dept: PHYSICAL THERAPY | Facility: CLINIC | Age: 35
End: 2025-04-10
Payer: COMMERCIAL

## 2025-04-10 LAB

## 2025-04-11 ENCOUNTER — OFFICE VISIT (OUTPATIENT)
Dept: PHYSICAL THERAPY | Facility: CLINIC | Age: 35
End: 2025-04-11
Payer: COMMERCIAL

## 2025-04-11 DIAGNOSIS — M54.2 ACUTE NECK PAIN: Primary | ICD-10-CM

## 2025-04-11 PROCEDURE — 97012 MECHANICAL TRACTION THERAPY: CPT | Performed by: PHYSICAL THERAPIST

## 2025-04-11 PROCEDURE — 97140 MANUAL THERAPY 1/> REGIONS: CPT | Performed by: PHYSICAL THERAPIST

## 2025-04-11 PROCEDURE — 97110 THERAPEUTIC EXERCISES: CPT | Performed by: PHYSICAL THERAPIST

## 2025-04-11 NOTE — PROGRESS NOTES
"Daily Note     Today's date: 2025  Patient name: Dae Julio  : 1990  MRN: [id number]  Referring provider: Deborah Obrien MD  Dx:   Encounter Diagnosis     ICD-10-CM    1. Acute neck pain  M54.2                      Subjective: Pt states the spasm he was experiencing down his arm has greatly reduced. He experienced some increase in muscle tension in right upper trapezius following previous session. Pt states he has been very compliant with HEP.      Objective: See treatment diary below      Assessment: Continued with current POC as it is greatly reducing pt's symptoms. Pt required minimal verbal cues to avoid shrugging during scapular retractions. Tolerated treatment well. Patient demonstrated fatigue post treatment, exhibited good technique with therapeutic exercises, and would benefit from continued PT      Plan: Progress treatment as tolerated.       Precautions: costochondral chest pain  CO-MORBIDITIES:  HEP ACCESS CODE: Educated pt to avoid positions that cause increase in radicular symptoms  FOTO Completed On: 4-3-25    POC Expires Reeval for Medicare to be completed Re-eval last completed on Unit Limit Auth Start Date Auth Expiration Date PT/OT/ST  Visit Limit   25 By visit  10  BOMN NA NA 24 prior to needing authorization                         TREATMENT DIARY  Auth Status DATE 4-3 (IE)           NA Visit # 1 2 3           Remaining 9 8 7          MANUAL THERAPY             STM to L UT/LS NV            STM to L scalenes NV            SOR  RR  JG                                                 THERAPEUTIC EXERCISE             UBE (ant/post) to improve UE strength/ROM 2'/2' 120 rpm NV 3/3 120 3'/3' 120 rpm                       Corner stretch low hands 4 x 30\" NV 30''4x 4 x 30\"                       Seated:             - L UT stretch 4 x 30\" NV 30''4x 4 x 30\"          - L LS stretch 4  x 30\" NV 30'4x 4 x 30\"                                                                            " "            NEUROMUSCULAR REEDUCATION              Seated:             - scapular retraction 10 x 5\" NV 5''20x 20 x 5\"                                                                                                                                                                                                                                                                 THERAPEUTIC ACTIVITY                                                                                                                                                                                                                             GAIT TRAINING                                                                              MODALITIES             Mechanical cervical traction:  - 10 degrees flexion  - 50% rope speed  - 5 steps up/down 10 mins, 12# pull 18#, 3/3 10 deg, 30 percent rope speed. NO CI, Decreased sx post traction.  18#, 3/3 10 deg, 30 percent rope speed. NO CI, Decreased sx post traction.          Cryo to cervical spine/L UT 10 mins NV            Estim to L UT/LS 10 mins NV                      * 23 minutes 1:1 time this date.  "

## 2025-04-15 ENCOUNTER — OFFICE VISIT (OUTPATIENT)
Dept: PHYSICAL THERAPY | Facility: CLINIC | Age: 35
End: 2025-04-15
Payer: COMMERCIAL

## 2025-04-15 DIAGNOSIS — M54.2 ACUTE NECK PAIN: Primary | ICD-10-CM

## 2025-04-15 PROCEDURE — 97012 MECHANICAL TRACTION THERAPY: CPT | Performed by: PHYSICAL THERAPIST

## 2025-04-15 PROCEDURE — 97140 MANUAL THERAPY 1/> REGIONS: CPT | Performed by: PHYSICAL THERAPIST

## 2025-04-15 NOTE — PROGRESS NOTES
"Daily Note     Today's date: 4/15/2025  Patient name: Dae Julio  : 1990  MRN: [id number]  Referring provider: Duglas Hartman DO  Dx:   Encounter Diagnosis     ICD-10-CM    1. Acute neck pain  M54.2                      Subjective: Pt reports he was sore the day of PT; however, the following day felt improvement.       Objective: See treatment diary below      Assessment: Pt was able to perform all exercises this date without increase in pain/discomfort. Pt did report a sharp pain with cervical traction this date with some symptoms radiating into head. Traction released and resumed with 15# pull with improvement in symptoms afterwards. Tolerated treatment well. Patient demonstrated fatigue post treatment, exhibited good technique with therapeutic exercises, and would benefit from continued PT      Plan: Progress treatment as tolerated.       Precautions: costochondral chest pain  CO-MORBIDITIES:  HEP ACCESS CODE: Educated pt to avoid positions that cause increase in radicular symptoms  FOTO Completed On: 4-3-25    POC Expires Reeval for Medicare to be completed Re-eval last completed on Unit Limit Auth Start Date Auth Expiration Date PT/OT/ST  Visit Limit   25 By visit  10  BOMN NA NA 24 prior to needing authorization                         TREATMENT DIARY  Auth Status DATE 4-3 (IE) 4/9 4-11 4-15         NA Visit # 1 2 3 4          Remaining 9 8 7 6         MANUAL THERAPY             STM to L UT/LS NV            STM to L scalenes NV            SOR  RR  JG JG                                                THERAPEUTIC EXERCISE             UBE (ant/post) to improve UE strength/ROM 2'/2' 120 rpm NV 3/3 120 3'/3' 120 rpm 4'/4' 120 rpm                      Corner stretch low hands 4 x 30\" NV 30''4x 4 x 30\" 4 x 30\"                      Seated:             - L UT stretch 4 x 30\" NV 30''4x 4 x 30\" 4 x 30\"         - L LS stretch 4  x 30\" NV 30'4x 4 x 30\" 4 x 30\"                                          " "                                             NEUROMUSCULAR REEDUCATION              Seated:             - scapular retraction 10 x 5\" NV 5''20x 20 x 5\" 20 x 5\"                                                                                                                                                                                                                                                                THERAPEUTIC ACTIVITY                                                                                                                                                                                                                             GAIT TRAINING                                                                              MODALITIES             Mechanical cervical traction:  - 10 degrees flexion  - 50% rope speed  - 5 steps up/down 10 mins, 12# pull 18#, 3/3 10 deg, 30 percent rope speed. NO CI, Decreased sx post traction.  18#, 3/3 10 deg, 30 percent rope speed. NO CI, Decreased sx post traction. 15#, 5/5 10 deg, 50 percent rope speed. NO CI,          Cryo to cervical spine/L UT 10 mins NV            Estim to L UT/LS 10 mins NV                          "

## 2025-04-16 ENCOUNTER — OFFICE VISIT (OUTPATIENT)
Dept: PHYSICAL THERAPY | Facility: CLINIC | Age: 35
End: 2025-04-16
Payer: COMMERCIAL

## 2025-04-16 DIAGNOSIS — M54.2 ACUTE NECK PAIN: Primary | ICD-10-CM

## 2025-04-16 PROCEDURE — 97012 MECHANICAL TRACTION THERAPY: CPT | Performed by: PHYSICAL THERAPIST

## 2025-04-16 PROCEDURE — 97140 MANUAL THERAPY 1/> REGIONS: CPT | Performed by: PHYSICAL THERAPIST

## 2025-04-16 PROCEDURE — 97110 THERAPEUTIC EXERCISES: CPT | Performed by: PHYSICAL THERAPIST

## 2025-04-16 NOTE — PROGRESS NOTES
"Daily Note     Today's date: 2025  Patient name: Dae Julio  : 1990  MRN: [id number]  Referring provider: Duglas Hartman DO  Dx:   Encounter Diagnosis     ICD-10-CM    1. Acute neck pain  M54.2                      Subjective: Pt continues to note improvement in left arm with PT. Feels traction is helping       Objective: See treatment diary below      Assessment: Tolerated treatment well. Patient exhibited good technique with therapeutic exercises and would benefit from continued PT  Good tolerance to treatment this date.  sx post treatment     Plan: Continue per plan of care.      Precautions: costochondral chest pain  CO-MORBIDITIES:  HEP ACCESS CODE: Educated pt to avoid positions that cause increase in radicular symptoms  FOTO Completed On: 4-3-25    POC Expires Reeval for Medicare to be completed Re-eval last completed on Unit Limit Auth Start Date Auth Expiration Date PT/OT/ST  Visit Limit   25 By visit  10  BOMN NA NA 24 prior to needing authorization                         TREATMENT DIARY  Auth Status DATE 4-3 (IE) 4/9 4-11 4-15 4/16        NA Visit # 1 2 3 4 5         Remaining 9 8 7 6 5        MANUAL THERAPY             STM to L UT/LS NV            STM to L scalenes NV            SOR  RR  JG JG RR                                               THERAPEUTIC EXERCISE             UBE (ant/post) to improve UE strength/ROM 2'/2' 120 rpm NV 3/3 120 3'/3' 120 rpm 4'/4' 120 rpm 5/5 120                     Corner stretch low hands 4 x 30\" NV 30''4x 4 x 30\" 4 x 30\" 30''x4                     Seated:             - L UT stretch 4 x 30\" NV 30''4x 4 x 30\" 4 x 30\" 30''x4        - L LS stretch 4  x 30\" NV 30'4x 4 x 30\" 4 x 30\" 30''x4                                                                                      NEUROMUSCULAR REEDUCATION              Seated:             - scapular retraction 10 x 5\" NV 5''20x 20 x 5\" 20 x 5\"                                                           "                                                                                                                                                                                                      THERAPEUTIC ACTIVITY                                                                                                                                                                                                                             GAIT TRAINING                                                                              MODALITIES             Mechanical cervical traction:  - 10 degrees flexion  - 50% rope speed  - 5 steps up/down 10 mins, 12# pull 18#, 3/3 10 deg, 30 percent rope speed. NO CI, Decreased sx post traction.  18#, 3/3 10 deg, 30 percent rope speed. NO CI, Decreased sx post traction. 15#, 5/5 10 deg, 50 percent rope speed. NO CI,  18#, 3/3 10 deg, 30 percent rope speed. NO CI, Decreased sx post traction.        Cryo to cervical spine/L UT 10 mins NV            Estim to L UT/LS 10 mins NV

## 2025-04-22 ENCOUNTER — APPOINTMENT (OUTPATIENT)
Dept: PHYSICAL THERAPY | Facility: CLINIC | Age: 35
End: 2025-04-22
Payer: COMMERCIAL

## 2025-04-22 NOTE — PROGRESS NOTES
"Daily Note     Today's date: 2025  Patient name: Dae Julio  : 1990  MRN: [id number]  Referring provider: Deborah Obrien MD  Dx: No diagnosis found.               Subjective: ***      Objective: See treatment diary below      Assessment: Tolerated treatment {Tolerated treatment :3654301924}. Patient {assessment:7353118662}      Plan: {PLAN:5029235832}     Precautions: costochondral chest pain  CO-MORBIDITIES:  HEP ACCESS CODE: Educated pt to avoid positions that cause increase in radicular symptoms  FOTO Completed On: 4-3-25    POC Expires Reeval for Medicare to be completed Re-eval last completed on Unit Limit Auth Start Date Auth Expiration Date PT/OT/ST  Visit Limit   25 By visit  10  BOMN NA NA 24 prior to needing authorization                         TREATMENT DIARY  Auth Status DATE 4-3 (IE) -15 4/16        NA Visit # 1 2 3 4 5         Remaining 9 8 7 6 5        MANUAL THERAPY             STM to L UT/LS NV            STM to L scalenes NV            SOR  RR  JG JG RR                                               THERAPEUTIC EXERCISE             UBE (ant/post) to improve UE strength/ROM 2'/2' 120 rpm NV 3/3 120 3'/3' 120 rpm 4'/4' 120 rpm 5/5 120                     Corner stretch low hands 4 x 30\" NV 30''4x 4 x 30\" 4 x 30\" 30''x4                     Seated:             - L UT stretch 4 x 30\" NV 30''4x 4 x 30\" 4 x 30\" 30''x4        - L LS stretch 4  x 30\" NV 30'4x 4 x 30\" 4 x 30\" 30''x4                                                                                      NEUROMUSCULAR REEDUCATION              Seated:             - scapular retraction 10 x 5\" NV 5''20x 20 x 5\" 20 x 5\"                                                                                                                                                                                                                                                                THERAPEUTIC ACTIVITY                              "                                                                                                                                                                                                GAIT TRAINING                                                                              MODALITIES             Mechanical cervical traction:  - 10 degrees flexion  - 50% rope speed  - 5 steps up/down 10 mins, 12# pull 18#, 3/3 10 deg, 30 percent rope speed. NO CI, Decreased sx post traction.  18#, 3/3 10 deg, 30 percent rope speed. NO CI, Decreased sx post traction. 15#, 5/5 10 deg, 50 percent rope speed. NO CI,  18#, 3/3 10 deg, 30 percent rope speed. NO CI, Decreased sx post traction.        Cryo to cervical spine/L UT 10 mins NV            Estim to L UT/LS 10 mins NV

## 2025-04-23 ENCOUNTER — OFFICE VISIT (OUTPATIENT)
Dept: PHYSICAL THERAPY | Facility: CLINIC | Age: 35
End: 2025-04-23
Payer: COMMERCIAL

## 2025-04-23 DIAGNOSIS — M54.2 ACUTE NECK PAIN: Primary | ICD-10-CM

## 2025-04-23 PROCEDURE — 97110 THERAPEUTIC EXERCISES: CPT | Performed by: PHYSICAL THERAPIST

## 2025-04-23 PROCEDURE — 97012 MECHANICAL TRACTION THERAPY: CPT | Performed by: PHYSICAL THERAPIST

## 2025-04-23 PROCEDURE — 97140 MANUAL THERAPY 1/> REGIONS: CPT | Performed by: PHYSICAL THERAPIST

## 2025-04-23 NOTE — PROGRESS NOTES
"Daily Note     Today's date: 2025  Patient name: Dae Julio  : 1990  MRN: [id number]  Referring provider: Deborah Obrien MD  Dx:   Encounter Diagnosis     ICD-10-CM    1. Acute neck pain  M54.2                      Subjective: Pt doing well, Sx have consistently centralized        Objective: See treatment diary below      Assessment: Tolerated treatment well. Patient exhibited good technique with therapeutic exercises and would benefit from continued PT      Plan: Continue per plan of care.      Precautions: costochondral chest pain  CO-MORBIDITIES:  HEP ACCESS CODE: Educated pt to avoid positions that cause increase in radicular symptoms  FOTO Completed On: 4-3-25    POC Expires Reeval for Medicare to be completed Re-eval last completed on Unit Limit Auth Start Date Auth Expiration Date PT/OT/ST  Visit Limit   25 By visit  10  BOMN NA NA 24 prior to needing authorization                         TREATMENT DIARY  Auth Status DATE 4-3 (IE) 4/9 4-11 4-15 4/16 4/23       NA Visit # 1 2 3 4 5 6        Remaining 9 8 7 6 5 4       MANUAL THERAPY             STM to L UT/LS NV            STM to L scalenes NV            SOR  RR  JG JG RR RR                                              THERAPEUTIC EXERCISE             UBE (ant/post) to improve UE strength/ROM 2'/2' 120 rpm NV 3/3 120 3'/3' 120 rpm 4'/4' 120 rpm 5/5 120 5/5 120                    Corner stretch low hands 4 x 30\" NV 30''4x 4 x 30\" 4 x 30\" 30''x4 30''4x                    Seated:             - L UT stretch 4 x 30\" NV 30''4x 4 x 30\" 4 x 30\" 30''x4 30''4x       - L LS stretch 4  x 30\" NV 30'4x 4 x 30\" 4 x 30\" 30''x4 30''4x                                                                                     NEUROMUSCULAR REEDUCATION              Seated:             - scapular retraction 10 x 5\" NV 5''20x 20 x 5\" 20 x 5\"  5''20x                                                                                                                          "                                                                                                                                     THERAPEUTIC ACTIVITY                                                                                                                                                                                                                             GAIT TRAINING                                                                              MODALITIES             Mechanical cervical traction:  - 10 degrees flexion  - 50% rope speed  - 5 steps up/down 10 mins, 12# pull 18#, 3/3 10 deg, 30 percent rope speed. NO CI, Decreased sx post traction.  18#, 3/3 10 deg, 30 percent rope speed. NO CI, Decreased sx post traction. 15#, 5/5 10 deg, 50 percent rope speed. NO CI,  18#, 3/3 10 deg, 30 percent rope speed. NO CI, Decreased sx post traction. 18#, 3/3 10 deg, 30 percent rope speed. NO CI, Decreased sx post traction.       Cryo to cervical spine/L UT 10 mins NV            Estim to L UT/LS 10 mins NV

## 2025-04-29 ENCOUNTER — APPOINTMENT (OUTPATIENT)
Dept: PHYSICAL THERAPY | Facility: CLINIC | Age: 35
End: 2025-04-29
Payer: COMMERCIAL

## 2025-04-29 NOTE — PROGRESS NOTES
"Daily Note     Today's date: 2025  Patient name: Dae Julio  : 1990  MRN: [id number]  Referring provider: Deborah Obrien MD  Dx: No diagnosis found.               Subjective: ***      Objective: See treatment diary below      Assessment: Tolerated treatment {Tolerated treatment :0092445956}. Patient {assessment:[id number]}      Plan: {PLAN:[id number]}     Precautions: costochondral chest pain  CO-MORBIDITIES:  HEP ACCESS CODE: Educated pt to avoid positions that cause increase in radicular symptoms  FOTO Completed On: 4-3-25    POC Expires Reeval for Medicare to be completed Re-eval last completed on Unit Limit Auth Start Date Auth Expiration Date PT/OT/ST  Visit Limit   25 By visit  10  BOMN NA NA 24 prior to needing authorization                         TREATMENT DIARY  Auth Status DATE 4-3 (IE) 4/9 4-11 4-15 4/16 4/23       NA Visit # 1 2 3 4 5 6        Remaining 9 8 7 6 5 4       MANUAL THERAPY             STM to L UT/LS NV            STM to L scalenes NV            SOR  RR  JG JG RR RR                                              THERAPEUTIC EXERCISE             UBE (ant/post) to improve UE strength/ROM 2'/2' 120 rpm NV 3/3 120 3'/3' 120 rpm 4'/4' 120 rpm 5/5 120 5/5 120                    Corner stretch low hands 4 x 30\" NV 30''4x 4 x 30\" 4 x 30\" 30''x4 30''4x                    Seated:             - L UT stretch 4 x 30\" NV 30''4x 4 x 30\" 4 x 30\" 30''x4 30''4x       - L LS stretch 4  x 30\" NV 30'4x 4 x 30\" 4 x 30\" 30''x4 30''4x                                                                                     NEUROMUSCULAR REEDUCATION              Seated:             - scapular retraction 10 x 5\" NV 5''20x 20 x 5\" 20 x 5\"  5''20x                                                                                                                                                                                                                                                            "   THERAPEUTIC ACTIVITY                                                                                                                                                                                                                             GAIT TRAINING                                                                              MODALITIES             Mechanical cervical traction:  - 10 degrees flexion  - 50% rope speed  - 5 steps up/down 10 mins, 12# pull 18#, 3/3 10 deg, 30 percent rope speed. NO CI, Decreased sx post traction.  18#, 3/3 10 deg, 30 percent rope speed. NO CI, Decreased sx post traction. 15#, 5/5 10 deg, 50 percent rope speed. NO CI,  18#, 3/3 10 deg, 30 percent rope speed. NO CI, Decreased sx post traction. 18#, 3/3 10 deg, 30 percent rope speed. NO CI, Decreased sx post traction.       Cryo to cervical spine/L UT 10 mins NV            Estim to L UT/LS 10 mins NV

## 2025-04-30 ENCOUNTER — APPOINTMENT (OUTPATIENT)
Dept: PHYSICAL THERAPY | Facility: CLINIC | Age: 35
End: 2025-04-30
Payer: COMMERCIAL

## (undated) DEVICE — POV-IOD SOLUTION 4OZ BT

## (undated) DEVICE — UNDYED MONOFILAMENT (POLYDIOXANONE), ABSORBABLE SYNTHETIC SURGICAL SUTURE: Brand: PDS

## (undated) DEVICE — PENCIL ELECTROSURG E-Z CLEAN -0035H

## (undated) DEVICE — 3M™ STERI-STRIP™ REINFORCED ADHESIVE SKIN CLOSURES, R1547, 1/2 IN X 4 IN (12 MM X 100 MM), 6 STRIPS/ENVELOPE: Brand: 3M™ STERI-STRIP™

## (undated) DEVICE — SYRINGE 10ML LL CONTROL TOP

## (undated) DEVICE — NEURO PATTIES 1/2 X 3

## (undated) DEVICE — SPLINT 1524050 5PK PAIR DOYLE II AIRWAY: Brand: DOYLE II ™

## (undated) DEVICE — SUT CHROMIC 5-0 P-3 18 IN 687G

## (undated) DEVICE — STRL COTTON TIP APPLCTR 6IN PK: Brand: CARDINAL HEALTH

## (undated) DEVICE — SUT PLAIN 4-0 SC-1 18 IN 1828H

## (undated) DEVICE — GLOVE INDICATOR PI UNDERGLOVE SZ 7.5 BLUE

## (undated) DEVICE — SCD SEQUENTIAL COMPRESSION COMFORT SLEEVE MEDIUM KNEE LENGTH: Brand: KENDALL SCD

## (undated) DEVICE — ELECTRODE NEEDLE MOD E-Z CLEAN 2.75IN 7CM -0013M

## (undated) DEVICE — 4-PORT MANIFOLD: Brand: NEPTUNE 2

## (undated) DEVICE — SUT ETHILON 4-0 PS-2 18 IN 1667H

## (undated) DEVICE — SUT PROLENE 6-0 P-3 18 IN 8695G

## (undated) DEVICE — NEEDLE 25G X 1 1/2

## (undated) DEVICE — SPECIMEN CONTAINER STERILE PEEL PACK

## (undated) DEVICE — SUT PROLENE 5-0 P-3 18 IN 8698G

## (undated) DEVICE — SKIN MARKER DUAL TIP WITH RULER CAP, FLEXIBLE RULER AND LABELS: Brand: DEVON

## (undated) DEVICE — MASTISOL LIQ ADHESIVE 2/3ML

## (undated) DEVICE — NEEDLE 18 G X 1 1/2

## (undated) DEVICE — STERILE BETHLEHEM NASAL PACK: Brand: CARDINAL HEALTH

## (undated) DEVICE — BLADE BEAVER MINI SZ 67

## (undated) DEVICE — 2000CC GUARDIAN II: Brand: GUARDIAN

## (undated) DEVICE — GAUZE SPONGES,16 PLY: Brand: CURITY

## (undated) DEVICE — INTENDED FOR TISSUE SEPARATION, AND OTHER PROCEDURES THAT REQUIRE A SHARP SURGICAL BLADE TO PUNCTURE OR CUT.: Brand: BARD-PARKER ® CARBON RIB-BACK BLADES

## (undated) DEVICE — GLOVE SRG BIOGEL 7.5